# Patient Record
Sex: FEMALE | Race: WHITE | NOT HISPANIC OR LATINO | Employment: FULL TIME | URBAN - METROPOLITAN AREA
[De-identification: names, ages, dates, MRNs, and addresses within clinical notes are randomized per-mention and may not be internally consistent; named-entity substitution may affect disease eponyms.]

---

## 2018-09-13 ENCOUNTER — TRANSFERRED RECORDS (OUTPATIENT)
Dept: HEALTH INFORMATION MANAGEMENT | Facility: CLINIC | Age: 33
End: 2018-09-13

## 2018-09-13 LAB
HPV ABSTRACT: NORMAL
PAP-ABSTRACT: NORMAL

## 2019-09-27 ENCOUNTER — TRANSFERRED RECORDS (OUTPATIENT)
Dept: HEALTH INFORMATION MANAGEMENT | Facility: CLINIC | Age: 34
End: 2019-09-27

## 2019-10-02 ENCOUNTER — THERAPY VISIT (OUTPATIENT)
Dept: PHYSICAL THERAPY | Facility: CLINIC | Age: 34
End: 2019-10-02
Payer: COMMERCIAL

## 2019-10-02 DIAGNOSIS — M72.2 BILATERAL PLANTAR FASCIITIS: ICD-10-CM

## 2019-10-02 DIAGNOSIS — M76.829 POSTERIOR TIBIAL TENDON DYSFUNCTION: ICD-10-CM

## 2019-10-02 PROCEDURE — 97161 PT EVAL LOW COMPLEX 20 MIN: CPT | Mod: GP | Performed by: PHYSICAL THERAPIST

## 2019-10-02 PROCEDURE — 97110 THERAPEUTIC EXERCISES: CPT | Mod: GP | Performed by: PHYSICAL THERAPIST

## 2019-10-02 NOTE — LETTER
[facility]  [address]  [place] [address]  [phone]    October 3, 2019    Re: Afia Pierce   :   1985  MRN:  [id number]   REFERRING PHYSICIAN:   Nirmal Otoole    [facility] PHYSICAL [facility]    Date of Initial Evaluation:  10/2/2019  Visits:  Rxs Used: 1  Reason for Referral:     Posterior tibial tendon dysfunction  Bilateral plantar fasciitis    EVALUATION SUMMARY    [facility]  Subjective:  The history is provided by the patient.  No  was used.   Type of problem: Bilateral ankles (left greater than right)  Condition occurred with: Insidious onset.  This is a chronic condition.  Problem details: 2018, I bought some shoes and as time progressed I started to have feet problems.  I saw the MD 2019.  I have orders for orthotics, going to get those made.  I had one ankle injury growing up.  I stand all the time for my job.  Patient reports pain: Anterior, longitudinal arch and medial calcaneal tuberosity (right anterior).  Radiates to: Ankle and lower leg (throbbing leg pain at night).  Associated with: none.  Symptoms are exacerbated by walking and weight bearing and relieved by heat.    Afia Pierce being seen for bilateral feet problem.   Problem began 2019.  Where condition occurred: for unknown reasons.  Problem occurred: bad shoes.  Reported as 7/10 (right greater than left) on pain scale.  General health as reported by patient is good.  Pertinent medical history includes: None.  Other medical allergies details: CT dye, cats, sulfa.  Surgeries include: None.  Current medications: None.  Other medications details: vitamins.  Primary job tasks include:  Prolonged standing, operating a machine/assembly and lifting/carrying.  Pain is described as aching and is constant.  Pain is worse in the P.M.  Since onset  symptoms are unchanged.  Imaging testing: none.  Patient is [facility]. Restrictions include: Working in normal job without restrictions.    Home/work barriers: apartment--3rd floor, no elevator   Red flags: None as reported by patient.    Objective:  Standing Alignment:    Lumbar: Sway back  Ankle/Foot: Calcaneal valgus L and calcaneal valgus R  General Deviations: Toe out L and toe out R    Re: Afia Pierce   :   1985    Gait:    Deviations:    Ankle: Push off decr L, push off decr R, heel strike decr L and heel strike decr R  General Deviations: Toe out L, toe out R, jennifer decr and stride length decr    Flexibility/Screens:   Lower Extremity:  Decreased left lower extremity flexibility: Gastroc and Soleus  Decreased right lower extremity flexibility: Gastroc and Soleus    Ankle/Foot Evaluation  ROM:    AROM:    Dorsiflexion:   Left: 8   Right: 6  Plantarflexion: Left: 55     Right: 58  Inversion:   Left: 30      Right: 28  Eversion:   Left: 15      Right: 15    Strength:    Dorsiflexion:    Left: 5/5      Pain:     Right: 5/5    Pain:  Plantarflexion:  Left: 5/5    Pain:     Right: 5/5  Pain:  Flexion Great Toe: Left: 5/5   Pain:    Right: 5/5    Pain:  Extension Great Toe: Left: 5/5   Pain:    Right: 5/5   Pain:  Anterior Tibialis: Left: 5/5   Pain:    Right: 4+/5   Pain:  Posterior Tibialis:  Left: 5/5   Pain:    Right: 4+/5   Pain:  Peroneals:   Left: 4+/5   Pain:    Right: 5-/5   Pain:  Extensor Digitorum:  Left: 4+/5   Pain:  Right: 5/5   Pain:    PALPATION:   Left ankle tenderness present at: posterior tibialis and plantar fascia  Right ankle tenderness present at: posterior tibialis and plantar fascia    MOBILITY TESTING:   Talocrural Left: hypomobile Talocrural Right: hypomobile  Subtalar Left: hypomobile     Subtalar Right: hypomobile    FUNCTIONAL TESTS:   Proprioception:  Stork Balance Test:   Left: poor hip control     Right: poor hip control      Assessment/Plan:    Patient is  a 34 year old female with both sides ankle and bilateral plantar fasciitis complaints.    Patient has the following significant findings with corresponding treatment plan.                Diagnosis 1: Bilateral plantar fasciitis and PTTD and left lateral instability    Pain - manual therapy, self management, education and home program  Decreased ROM/flexibility - manual therapy, therapeutic exercise, therapeutic activity and home program    Re: Afia Pierce   :   1985    Decreased joint mobility - manual therapy, therapeutic exercise, therapeutic activity and home program  Decreased strength - therapeutic exercise, therapeutic activities and home program  Impaired muscle performance - neuro re-education and home program  Decreased function - therapeutic activities and home program  Impaired posture - neuro re-education, therapeutic activities and home program     Therapy Evaluation Codes:   Cumulative Therapy Evaluation is: Low complexity.    Previous and current functional limitations: (See Goal Flow Sheet for this information)    Short term and Long term goals: (See Goal Flow Sheet for this information)     Communication ability: Patient appears to be able to clearly communicate and understand verbal and written communication and follow directions correctly.  Treatment Explanation - The following has been discussed with the patient:     RX ordered/plan of care  This patient would benefit from PT intervention to resume normal activities.   Rehab potential is good.    Frequency: 1 X week, once daily  Duration: for 8 weeks  Discharge Plan: Achieve all LTG.  Independent in home treatment program.      Thank you for your referral.      INQUIRIES  Therapist: Caitlin Lopez, PT   [facility] [place] [facility] PH[facility] THERAP[facility]  [address] [address]  Phone: [phone]  Fax: [phone]

## 2019-10-02 NOTE — PROGRESS NOTES
[facility] Initial Evaluation  Subjective:  The history is provided by the patient. No  was used.   Type of problem:  Bilateral ankles (left greater than right)   Condition occurred with:  Insidious onset. This is a chronic condition   Problem details: 12/2018, I bought some shoes and as time progressed I started to have feet problems.  I saw the MD 7/26/2019.  I have orders for orthotic going to get those made.  I had one ankle injury growing up.  I stand all the time for my job.   Patient reports pain:  Anterior, longitudinal arch and medial calcaneal tuberosity (right anterior). Radiates to:  Ankle and lower leg (throbbing leg pain at night). Associated with: none. Symptoms are exacerbated by walking and weight bearing and relieved by heat.    Afia Pierce being seen for bilateral feet problem.   Problem began 7/26/2019. Where condition occurred: for unknown reasons.Problem occurred: bad shoes  and reported as 7/10 (right greater than left ) on pain scale. General health as reported by patient is good. Pertinent medical history includes:  None.   Other medical allergies details: CT dye, cats sulfa.  Surgeries include:  None.  Current medications:  None. Other medications details: vitamins.   Primary job tasks include:  Prolonged standing, operating a machine/assembly and lifting/carrying.  Pain is described as aching and is constant. Pain is worse in the P.M.. Since onset symptoms are unchanged. Imaging testing: none.     Patient is [employer] . Restrictions include:  Working in normal job without restrictions.    Home/work barriers: apartment--3rd floor no elevator   Red flags:  None as reported by patient.                      Objective:  Standing Alignment:        Lumbar:  Sway back        Ankle/Foot:  Calcaneal valgus L and calcaneal valgus R  General Deviations:  Toe out L and toe out R  Gait:      Deviations:  Ankle:  Push off decr L, push off decr R,  heel strike decr L and heel strike decr RGeneral Deviations:  Toe out L, toe out R, jennifer decr and stride length decr    Flexibility/Screens:       Lower Extremity:  Decreased left lower extremity flexibility:Gastroc and Soleus    Decreased right lower extremity flexibility:  Gastroc and Soleus          Ankle/Foot Evaluation  ROM:    AROM:    Dorsiflexion:  Left:   8  Right:   6  Plantarflexion:  Left:  55    Right:  58  Inversion:  Left:  30     Right:  28  Eversion:  15     Right:  15        Strength:    Dorsiflexion:  Left: 5/5     Pain:   Right: 5/5   Pain:  Plantarflexion: Left: 5/5   Pain:   Right: 5/5  Pain:      Flexion Great Toe:Left: 5/5  Pain:  Right: 5/5   Pain:  Extension Great Toe:Left: 5/5  Pain:  Right: 5/5  Pain:  Anterior Tibialis:Left: 5/5  Pain:  Right: 4+/5  Pain:  Posterior Tibialis: Left: 5/5  Pain:  Right: 4+/5  Pain:  Peroneals: Left: 4+/5  Pain:  Right: 5-/5  Pain:  Extensor Digitorum: Left: 4+/5  Pain:Right: 5/5  Pain:          PALPATION:   Left ankle tenderness present at:  posterior tibialis and plantar fascia  Right ankle tenderness present at:   posterior tibialis and plantar fascia    MOBILITY TESTING:       Talocrural Left: hypomobile    Talocrural Right: hypomobile  Subtalar Left: hypomobile    Subtalar Right: hypomobile      FUNCTIONAL TESTS:           Proprioception:  Stork Balance Test: Left: poor hip control   Right: poor hip control                                                      General     ROS    Assessment/Plan:    Patient is a 34 year old female with both sides ankle and bilateral plantar fasciitis complaints.    Patient has the following significant findings with corresponding treatment plan.                Diagnosis 1:  Bilateral plantar fasciitis and PTTD and left lateral instability  Pain -  manual therapy, self management, education and home program  Decreased ROM/flexibility - manual therapy, therapeutic exercise, therapeutic activity and home  program  Decreased joint mobility - manual therapy, therapeutic exercise, therapeutic activity and home program  Decreased strength - therapeutic exercise, therapeutic activities and home program  Impaired muscle performance - neuro re-education and home program  Decreased function - therapeutic activities and home program  Impaired posture - neuro re-education, therapeutic activities and home program     Therapy Evaluation Codes:   Cumulative Therapy Evaluation is: Low complexity.    Previous and current functional limitations:  (See Goal Flow Sheet for this information)    Short term and Long term goals: (See Goal Flow Sheet for this information)     Communication ability:  Patient appears to be able to clearly communicate and understand verbal and written communication and follow directions correctly.  Treatment Explanation - The following has been discussed with the patient:   RX ordered/plan of care  This patient would benefit from PT intervention to resume normal activities.   Rehab potential is good.    Frequency:  1 X week, once daily  Duration:  for 8 weeks  Discharge Plan:  Achieve all LTG.  Independent in home treatment program.    Please refer to the daily flowsheet for treatment today, total treatment time and time spent performing 1:1 timed codes.

## 2019-10-03 PROBLEM — M72.2 BILATERAL PLANTAR FASCIITIS: Status: ACTIVE | Noted: 2019-10-03

## 2019-10-03 PROBLEM — M76.829 POSTERIOR TIBIAL TENDON DYSFUNCTION: Status: ACTIVE | Noted: 2019-10-03

## 2019-10-09 ENCOUNTER — THERAPY VISIT (OUTPATIENT)
Dept: PHYSICAL THERAPY | Facility: CLINIC | Age: 34
End: 2019-10-09
Payer: COMMERCIAL

## 2019-10-09 DIAGNOSIS — M76.829 POSTERIOR TIBIAL TENDON DYSFUNCTION: ICD-10-CM

## 2019-10-09 DIAGNOSIS — M72.2 BILATERAL PLANTAR FASCIITIS: ICD-10-CM

## 2019-10-09 PROCEDURE — 97140 MANUAL THERAPY 1/> REGIONS: CPT | Mod: GP | Performed by: PHYSICAL THERAPIST

## 2019-10-09 PROCEDURE — 97112 NEUROMUSCULAR REEDUCATION: CPT | Mod: GP | Performed by: PHYSICAL THERAPIST

## 2019-10-09 PROCEDURE — 97110 THERAPEUTIC EXERCISES: CPT | Mod: GP | Performed by: PHYSICAL THERAPIST

## 2019-10-17 ENCOUNTER — THERAPY VISIT (OUTPATIENT)
Dept: PHYSICAL THERAPY | Facility: CLINIC | Age: 34
End: 2019-10-17
Payer: COMMERCIAL

## 2019-10-17 DIAGNOSIS — M76.829 POSTERIOR TIBIAL TENDON DYSFUNCTION: ICD-10-CM

## 2019-10-17 DIAGNOSIS — M72.2 BILATERAL PLANTAR FASCIITIS: ICD-10-CM

## 2019-10-17 PROCEDURE — 97112 NEUROMUSCULAR REEDUCATION: CPT | Mod: GP | Performed by: PHYSICAL THERAPIST

## 2019-10-17 PROCEDURE — 97110 THERAPEUTIC EXERCISES: CPT | Mod: GP | Performed by: PHYSICAL THERAPIST

## 2019-10-17 PROCEDURE — 97530 THERAPEUTIC ACTIVITIES: CPT | Mod: GP | Performed by: PHYSICAL THERAPIST

## 2019-10-31 NOTE — PROGRESS NOTES
SUBJECTIVE:                                                   Afia Pierce is a 34 year old female who presents to clinic today for the following health issue(s):  Patient presents with:  Consult: pevlic pain      HPI: Patient is seen at this time for evaluation of progressive increase in pelvic pain.  She has midcycle and severe premenstrual pain.  She does have dyspareunia.  She has never been pregnant but did have a hysterosalpingogram that was negative.  She has had no previous pelvic surgery.  She does not recall any recent ultrasonography of the pelvis.  She is otherwise healthy.      No LMP recorded..     Patient is sexually active, No obstetric history on file..  Using none for contraception.    reports that she has never smoked. She has never used smokeless tobacco.    STD testing offered?  Declined    Health maintenance updated:  yes    Today's PHQ-2 Score: No flowsheet data found.  Today's PHQ-9 Score:   PHQ-9 SCORE 11/4/2019   PHQ-9 Total Score 3     Today's ANSLEY-7 Score:   ANSLEY-7 SCORE 11/4/2019   Total Score 2       Problem list and histories reviewed & adjusted, as indicated.  Additional history: as documented.    Patient Active Problem List   Diagnosis     Posterior tibial tendon dysfunction     Bilateral plantar fasciitis     Past Surgical History:   Procedure Laterality Date     NO HISTORY OF SURGERY        Social History     Tobacco Use     Smoking status: Never Smoker     Smokeless tobacco: Never Used   Substance Use Topics     Alcohol use: Not Currently      Problem (# of Occurrences) Relation (Name,Age of Onset)    Breast Cancer (1) Maternal Aunt    Diabetes (1) Maternal Grandmother    Ovarian Cancer (1) Maternal Aunt    Uterine Cancer (1) Maternal Aunt            Current Outpatient Medications   Medication Sig     MULTIPLE VITAMIN PO      Omega-3 Fatty Acids (FISH OIL PO)      ondansetron (ZOFRAN) 4 MG tablet Take by mouth every 8 hours as needed for nausea     No current facility-administered  "medications for this visit.      Allergies   Allergen Reactions     Sulfa Drugs Other (See Comments)     GI Upset     Diagnostic X-Ray Materials Itching     Mild itching and hives  Mild itching and hives         ROS:  12 point review of systems negative other than symptoms noted below.  Constitutional: Loss of Appetite and Weight Loss  Head: Nasal Congestion  Gastrointestinal: Abdominal Pain, Bloating, Constipation, Heartburn and Nausea  Genitourinary: Cramps, Painful [place], Painful Urination, Pelvic Pain and Vaginal Dryness  Skin: Acne and New Skin Lesions  Neurologic: Dizziness and Headaches  Musculoskeletal: Muscular Weakness  Endocrine: Cold Intolerance, Decreased Libido and Loss of Hair  Psychiatric: Difficulty Sleeping and Alas    OBJECTIVE:     BP 96/58   Pulse 64   Ht 1.549 m (5' 1\")   Wt 40.4 kg (89 lb)   BMI 16.82 kg/m    Body mass index is 16.82 kg/m .    Exam:  Constitutional:  Appearance: Well nourished, well developed alert, in no acute distress HEENT is negative.  Chest is clear to percussion auscultation all fields.  Cardiovascular exam within normal limits with normal S1-S2 no murmur.  Gastrointestinal:  Abdominal Examination:  Abdomen nontender to palpation, tone normal without rigidity or guarding, no masses present, umbilicus without lesions; Liver/Spleen:  No hepatomegaly present, liver nontender to palpation; Hernias:  No hernias present  Lymphatic: Lymph Nodes:  No other lymphadenopathy present  Skin: General Inspection:  No rashes present, no lesions present, no areas of discoloration.  Neurologic:  Mental Status:  Oriented X3.  Normal strength and tone, sensory exam grossly normal, mentation intact and speech normal.    Psychiatric:  Mentation appears normal and affect normal/bright.  Pelvic Exam:  External Genitalia:     Normal appearance for age, no discharge present, no tenderness present, no inflammatory lesions present, color normal  Vagina:     Normal vaginal vault without " central or paravaginal defects, no discharge present, no inflammatory lesions present, no masses present  Bladder:     Nontender to palpation  Urethra:   Urethral Body:  Urethra palpation normal, urethra structural support normal   Urethral Meatus:  No erythema or lesions present  Cervix:     Appearance healthy, no lesions present, nontender to palpation, no bleeding present  Uterus:     Uterus: firm, normal sized and tender, midplane in position.   Adnexa:     Right adnexal tenderness present, no adnexal masses present  Perineum:     Perineum within normal limits, no evidence of trauma, no rashes or skin lesions present  Anus:     Anus within normal limits, no hemorrhoids present  Inguinal Lymph Nodes:     No lymphadenopathy present  Pubic Hair:     Normal pubic hair distribution for age  Genitalia and Groin:     No rashes present, no lesions present, no areas of discoloration, no masses present       In-Clinic Test Results:      ASSESSMENT/PLAN:                                                      Patient is seen at this time for evaluation of pelvic pain.  It appears that she has a very tender right adnexa and nodular tender uterosacral ligaments bilaterally.  She has not had any imaging for over a year and we will proceed to a vaginal probe ultrasound.  The possibility of endometriosis is very real.  We discussed the option of laparoscopy with CO2 laser treatment.  The patient will be seen immediately after ultrasound.          Marvin Solomon MD  [facility] [place]

## 2019-11-04 ENCOUNTER — PREP FOR PROCEDURE (OUTPATIENT)
Dept: OBGYN | Facility: CLINIC | Age: 34
End: 2019-11-04

## 2019-11-04 ENCOUNTER — TELEPHONE (OUTPATIENT)
Dept: OBGYN | Facility: CLINIC | Age: 34
End: 2019-11-04

## 2019-11-04 ENCOUNTER — OFFICE VISIT (OUTPATIENT)
Dept: OBGYN | Facility: CLINIC | Age: 34
End: 2019-11-04
Payer: COMMERCIAL

## 2019-11-04 VITALS
SYSTOLIC BLOOD PRESSURE: 96 MMHG | HEIGHT: 61 IN | HEART RATE: 64 BPM | DIASTOLIC BLOOD PRESSURE: 58 MMHG | WEIGHT: 89 LBS | BODY MASS INDEX: 16.8 KG/M2

## 2019-11-04 DIAGNOSIS — R10.2 PELVIC PAIN: Primary | ICD-10-CM

## 2019-11-04 PROCEDURE — 99204 OFFICE O/P NEW MOD 45 MIN: CPT | Performed by: OBSTETRICS & GYNECOLOGY

## 2019-11-04 RX ORDER — ONDANSETRON 4 MG/1
TABLET, FILM COATED ORAL EVERY 8 HOURS PRN
COMMUNITY

## 2019-11-04 ASSESSMENT — PATIENT HEALTH QUESTIONNAIRE - PHQ9
5. POOR APPETITE OR OVEREATING: NOT AT ALL
SUM OF ALL RESPONSES TO PHQ QUESTIONS 1-9: 3

## 2019-11-04 ASSESSMENT — MIFFLIN-ST. JEOR: SCORE: 1051.08

## 2019-11-04 ASSESSMENT — ANXIETY QUESTIONNAIRES
3. WORRYING TOO MUCH ABOUT DIFFERENT THINGS: SEVERAL DAYS
7. FEELING AFRAID AS IF SOMETHING AWFUL MIGHT HAPPEN: NOT AT ALL
GAD7 TOTAL SCORE: 2
1. FEELING NERVOUS, ANXIOUS, OR ON EDGE: NOT AT ALL
IF YOU CHECKED OFF ANY PROBLEMS ON THIS QUESTIONNAIRE, HOW DIFFICULT HAVE THESE PROBLEMS MADE IT FOR YOU TO DO YOUR WORK, TAKE CARE OF THINGS AT HOME, OR GET ALONG WITH OTHER PEOPLE: NOT DIFFICULT AT ALL
6. BECOMING EASILY ANNOYED OR IRRITABLE: SEVERAL DAYS
2. NOT BEING ABLE TO STOP OR CONTROL WORRYING: NOT AT ALL
5. BEING SO RESTLESS THAT IT IS HARD TO SIT STILL: NOT AT ALL

## 2019-11-04 NOTE — TELEPHONE ENCOUNTER
Type of surgery: LSC C02 LASER  Location of surgery: [facility] OR  Date and time of surgery: 11/21/2019 9:30a  Surgeon: Juanito  Pre-Op Appt Date: 11/13/2019  Post-Op Appt Date: TBD   Packet sent out: HANDED 11/4/2019  Pre-cert/Authorization completed:  TBD  Date: 11/4/2019 Jasmeet das/Selene Camarena  Surgery Scheduler

## 2019-11-05 ASSESSMENT — ANXIETY QUESTIONNAIRES: GAD7 TOTAL SCORE: 2

## 2019-11-11 NOTE — PROGRESS NOTES
SUBJECTIVE:                                                   Afia Pierce is a 34 year old female who presents to clinic today for the following health issue(s):  Patient presents with:  Ultrasound      HPI: The patient is seen at this time in follow-up of pelvic pain.  She has pain most mornings and avoid sexual activity because of dyspareunia.  Ultrasound is performed today.      Patient's last menstrual period was 10/16/2019..     Patient is sexually active, .  Using none for contraception.    reports that she has never smoked. She has never used smokeless tobacco.    STD testing offered?  Declined    Health maintenance updated:  yes    Today's PHQ-2 Score:   PHQ-2 (  [employer]) 2019   Q1: Little interest or pleasure in doing things 0   Q2: Feeling down, depressed or hopeless 0   PHQ-2 Score 0     Today's PHQ-9 Score:   PHQ-9 SCORE 2019   PHQ-9 Total Score 3     Today's ANSLEY-7 Score:   ANSLEY-7 SCORE 2019   Total Score 2       Problem list and histories reviewed & adjusted, as indicated.  Additional history: as documented.    Patient Active Problem List   Diagnosis     Posterior tibial tendon dysfunction     Bilateral plantar fasciitis     Pelvic pain     Past Surgical History:   Procedure Laterality Date     NO HISTORY OF SURGERY        Social History     Tobacco Use     Smoking status: Never Smoker     Smokeless tobacco: Never Used   Substance Use Topics     Alcohol use: Not Currently      Problem (# of Occurrences) Relation (Name,Age of Onset)    Breast Cancer (1) Maternal Aunt    Diabetes (1) Maternal Grandmother    Ovarian Cancer (1) Maternal Aunt    Uterine Cancer (1) Maternal Aunt            Current Outpatient Medications   Medication Sig     MULTIPLE VITAMIN PO      Omega-3 Fatty Acids (FISH OIL PO)      ondansetron (ZOFRAN) 4 MG tablet Take by mouth every 8 hours as needed for nausea     No current facility-administered medications for this visit.      Allergies   Allergen  "Reactions     Sulfa Drugs Other (See Comments)     GI Upset     Diagnostic X-Ray Materials Itching     Mild itching and hives  Mild itching and hives         ROS:  12 point review of systems negative other than symptoms noted below.    OBJECTIVE:     BP 94/58   Pulse 64   Ht 1.549 m (5' 1\")   Wt 40.4 kg (89 lb)   LMP 10/16/2019   BMI 16.82 kg/m    Body mass index is 16.82 kg/m .    Exam:  Constitutional:  Appearance: Well nourished, well developed alert, in no acute distress  Neck:  Lymph Nodes:  No lymphadenopathy present; Thyroid:  Gland size normal, nontender, no nodules or masses present on palpation  Chest:  Respiratory Effort:  Breathing unlabored. Clear to auscultation bilaterally.   Cardiovascular: Heart: Auscultation:  Regular rate, normal rhythm, no murmurs present  Gastrointestinal:  Abdominal Examination:  Abdomen nontender to palpation, tone normal without rigidity or guarding, no masses present, umbilicus without lesions; Liver/Spleen:  No hepatomegaly present, liver nontender to palpation; Hernias:  No hernias present  Lymphatic: Lymph Nodes:  No other lymphadenopathy present  Skin: General Inspection:  No rashes present, no lesions present, no areas of discoloration.  Neurologic:  Mental Status:  Oriented X3.  Normal strength and tone, sensory exam grossly normal, mentation intact and speech normal.    Psychiatric:  Mentation appears normal and affect normal/bright.  No Pelvic Exam performed due to ultrasound    In-Clinic Test Results: Ultrasound shows no fibroids or large ovarian masses.  Her endometrial lining is 8 mm.      ASSESSMENT/PLAN:                                                        34-year-old patient with debilitating pelvic pain who is seen in follow-up of ultrasound today.  There were no large ovarian cysts or uterine fibroids.  We have presented the option of laparoscopic evaluation to rule out endometriosis as her last pelvic examination showed tenderness on the uterosacral " ligaments and on the right adnexa.  The risks and complications of the procedure have been reviewed and accepted.  This includes general anesthesia blood loss infection injury to bowel bladder ureters and major vessels necessitating further surgery.        Marvin Solomon MD  [facility]

## 2019-11-13 ENCOUNTER — OFFICE VISIT (OUTPATIENT)
Dept: OBGYN | Facility: CLINIC | Age: 34
End: 2019-11-13
Attending: OBSTETRICS & GYNECOLOGY
Payer: COMMERCIAL

## 2019-11-13 ENCOUNTER — ANCILLARY PROCEDURE (OUTPATIENT)
Dept: ULTRASOUND IMAGING | Facility: CLINIC | Age: 34
End: 2019-11-13
Attending: OBSTETRICS & GYNECOLOGY
Payer: COMMERCIAL

## 2019-11-13 VITALS
BODY MASS INDEX: 16.8 KG/M2 | HEIGHT: 61 IN | DIASTOLIC BLOOD PRESSURE: 58 MMHG | SYSTOLIC BLOOD PRESSURE: 94 MMHG | HEART RATE: 64 BPM | WEIGHT: 89 LBS

## 2019-11-13 DIAGNOSIS — R10.2 PELVIC PAIN: ICD-10-CM

## 2019-11-13 DIAGNOSIS — R10.2 PELVIC PAIN: Primary | ICD-10-CM

## 2019-11-13 PROCEDURE — 76830 TRANSVAGINAL US NON-OB: CPT | Performed by: OBSTETRICS & GYNECOLOGY

## 2019-11-13 PROCEDURE — 99214 OFFICE O/P EST MOD 30 MIN: CPT | Performed by: OBSTETRICS & GYNECOLOGY

## 2019-11-13 ASSESSMENT — MIFFLIN-ST. JEOR: SCORE: 1051.08

## 2019-11-13 NOTE — Clinical Note
Please abstract the following data from this visit with this patient into the appropriate field in Epic:Tests that can be patient reported without a hard copy:Other Tests found in the patient's chart through Chart Review/Care Everywhere:Pap smear done by this group [facility] this date: 9/13/18Note to Abstraction: If this section is blank, no results were found via Chart Review/Care Everywhere.

## 2019-11-13 NOTE — NURSING NOTE
Please abstract the following data from this visit with this patient into the appropriate field in Epic:    Tests that can be patient reported without a hard copy:        Other Tests found in the patient's chart through Chart Review/Care Everywhere:    Pap smear done by this group [facility] this date: 9/13/18    Note to Abstraction: If this section is blank, no results were found via Chart Review/Care Everywhere.

## 2019-11-15 ENCOUNTER — HOSPITAL ENCOUNTER (OUTPATIENT)
Dept: NUCLEAR MEDICINE | Facility: CLINIC | Age: 34
Setting detail: NUCLEAR MEDICINE
Discharge: HOME | End: 2019-11-15
Attending: PHYSICIAN ASSISTANT | Admitting: PHYSICIAN ASSISTANT
Payer: COMMERCIAL

## 2019-11-15 VITALS — BODY MASS INDEX: 16.82 KG/M2 | WEIGHT: 89 LBS

## 2019-11-15 DIAGNOSIS — R11.0 NAUSEA: ICD-10-CM

## 2019-11-15 DIAGNOSIS — R10.10 UPPER ABDOMINAL PAIN: ICD-10-CM

## 2019-11-15 DIAGNOSIS — R63.0 LOSS OF APPETITE: ICD-10-CM

## 2019-11-15 DIAGNOSIS — D36.9 ADENOMATOUS POLYP: ICD-10-CM

## 2019-11-15 PROCEDURE — A9537 TC99M MEBROFENIN: HCPCS

## 2019-11-15 PROCEDURE — 78227 HEPATOBIL SYST IMAGE W/DRUG: CPT

## 2019-11-15 PROCEDURE — 25000128 H RX IP 250 OP 636

## 2019-11-15 PROCEDURE — 34300033 ZZH RX 343

## 2019-11-15 RX ORDER — SINCALIDE 5 UG/5ML
0.02 INJECTION, POWDER, LYOPHILIZED, FOR SOLUTION INTRAVENOUS ONCE
Status: COMPLETED | OUTPATIENT
Start: 2019-11-15 | End: 2019-11-15

## 2019-11-15 RX ORDER — KIT FOR THE PREPARATION OF TECHNETIUM TC 99M MEBROFENIN 45 MG/10ML
6 INJECTION, POWDER, LYOPHILIZED, FOR SOLUTION INTRAVENOUS ONCE
Status: COMPLETED | OUTPATIENT
Start: 2019-11-15 | End: 2019-11-15

## 2019-11-15 RX ADMIN — SINCALIDE 0.8 MCG: 5 INJECTION, POWDER, LYOPHILIZED, FOR SOLUTION INTRAVENOUS at 08:50

## 2019-11-15 RX ADMIN — MEBROFENIN 6.2 MILLICURIE: 45 INJECTION, POWDER, LYOPHILIZED, FOR SOLUTION INTRAVENOUS at 07:45

## 2019-11-19 ENCOUNTER — NURSE TRIAGE (OUTPATIENT)
Dept: NURSING | Facility: CLINIC | Age: 34
End: 2019-11-19

## 2019-11-19 NOTE — TELEPHONE ENCOUNTER
Afia had a nuclear medicine test done on Fri, 11/15.    She was told that results should be available after 2 business days.    She is calling about results.    Notified that imaging results have not yet been released to medical record.    She wants to know if she could be called with results.  She reports that the test was ordered by an outside provider.    Advised to contact that provider's office during clinic hours.    She might also call nurse line again to ask if results have been released.    Kerry Mcdonald RN  [facility]    Reason for Disposition    Caller requesting lab results    Additional Information    Lab result questions    Protocols used: PCP CALL - NO TRIAGE-A-AH, INFORMATION ONLY CALL-A-AH

## 2019-11-20 ENCOUNTER — TELEPHONE (OUTPATIENT)
Dept: OBGYN | Facility: CLINIC | Age: 34
End: 2019-11-20

## 2019-11-20 RX ORDER — PHENAZOPYRIDINE HYDROCHLORIDE 200 MG/1
200 TABLET, FILM COATED ORAL ONCE
Status: CANCELLED | OUTPATIENT
Start: 2019-11-20 | End: 2019-11-20

## 2019-11-20 NOTE — TELEPHONE ENCOUNTER
Patient scheduled for surgery 11/21 with Dr. Solomon, she would like a call back regarding cold symptoms she is having.  She is wondering if she needs to cancel her surgery.

## 2019-11-20 NOTE — TELEPHONE ENCOUNTER
Called the pt back. She was calling to report that she has no green nasal drainage but continue to have a sore throat. Feels it is from the post nasal drip. Denies fever/ feels achy. Feels nauseated but that comes and goes.     Pt would like to know if she can still have her surgery tomorrow.    Routing to Dr Solomon to advise.

## 2019-11-20 NOTE — H&P
2019  [facility] [place]   Marvin Solomon MD   OB/Gyn   Pelvic pain   Dx   Ultrasound ; Referred by Marvin Solomon MD   Reason for Visit    Nursing Note   Zoya Pederson MA (Medical Assistant)      Please abstract the following data from this visit with this patient into the appropriate field in Epic:     Tests that can be patient reported without a hard copy:           Other Tests found in the patient's chart through Chart Review/Care Everywhere:     Pap smear done by this group [facility] this date: 18     Note to Abstraction: If this section is blank, no results were found via Chart Review/Care Everywhere.               Progress Notes   Marvin Solomon MD (Physician)     OB/Gyn            SUBJECTIVE:                                                   Afia Pierce is a 34 year old female who presents to clinic today for the following health issue(s):  Patient presents with:  Ultrasound        HPI: The patient is seen at this time in follow-up of pelvic pain.  She has pain most mornings and avoid sexual activity because of dyspareunia.  Ultrasound is performed today.        Patient's last menstrual period was 10/16/2019..      Patient is sexually active, .  Using none for contraception.    reports that she has never smoked. She has never used smokeless tobacco.     STD testing offered?  Declined     Health maintenance updated:  yes     Today's PHQ-2 Score:   PHQ-2 (  [employer]) 2019   Q1: Little interest or pleasure in doing things 0   Q2: Feeling down, depressed or hopeless 0   PHQ-2 Score 0      Today's PHQ-9 Score:   PHQ-9 SCORE 2019   PHQ-9 Total Score 3      Today's ANSLEY-7 Score:   ANSLEY-7 SCORE 2019   Total Score 2         Problem list and histories reviewed & adjusted, as indicated.  Additional history: as documented.         Patient Active Problem List   Diagnosis     Posterior tibial tendon dysfunction     Bilateral plantar fasciitis     Pelvic  "pain             Past Surgical History:   Procedure Laterality Date     NO HISTORY OF SURGERY           Social History      Tobacco Use     Smoking status: Never Smoker     Smokeless tobacco: Never Used   Substance Use Topics     Alcohol use: Not Currently       Problem (# of Occurrences) Relation (Name,Age of Onset)     Breast Cancer (1) Maternal Aunt     Diabetes (1) Maternal Grandmother     Ovarian Cancer (1) Maternal Aunt     Uterine Cancer (1) Maternal Aunt                   Current Outpatient Medications   Medication Sig     MULTIPLE VITAMIN PO       Omega-3 Fatty Acids (FISH OIL PO)       ondansetron (ZOFRAN) 4 MG tablet Take by mouth every 8 hours as needed for nausea      No current facility-administered medications for this visit.             Allergies   Allergen Reactions     Sulfa Drugs Other (See Comments)       GI Upset     Diagnostic X-Ray Materials Itching       Mild itching and hives  Mild itching and hives            ROS:  12 point review of systems negative other than symptoms noted below.     OBJECTIVE:      BP 94/58   Pulse 64   Ht 1.549 m (5' 1\")   Wt 40.4 kg (89 lb)   LMP 10/16/2019   BMI 16.82 kg/m    Body mass index is 16.82 kg/m .     Exam:  Constitutional:  Appearance: Well nourished, well developed alert, in no acute distress  Neck:  Lymph Nodes:  No lymphadenopathy present; Thyroid:  Gland size normal, nontender, no nodules or masses present on palpation  Chest:  Respiratory Effort:  Breathing unlabored. Clear to auscultation bilaterally.   Cardiovascular: Heart: Auscultation:  Regular rate, normal rhythm, no murmurs present  Gastrointestinal:  Abdominal Examination:  Abdomen nontender to palpation, tone normal without rigidity or guarding, no masses present, umbilicus without lesions; Liver/Spleen:  No hepatomegaly present, liver nontender to palpation; Hernias:  No hernias present  Lymphatic: Lymph Nodes:  No other lymphadenopathy present  Skin: General Inspection:  No rashes " "present, no lesions present, no areas of discoloration.  Neurologic:  Mental Status:  Oriented X3.  Normal strength and tone, sensory exam grossly normal, mentation intact and speech normal.    Psychiatric:  Mentation appears normal and affect normal/bright.  No Pelvic Exam performed due to ultrasound     In-Clinic Test Results: Ultrasound shows no fibroids or large ovarian masses.  Her endometrial lining is 8 mm.        ASSESSMENT/PLAN:                                                          34-year-old patient with debilitating pelvic pain who is seen in follow-up of ultrasound today.  There were no large ovarian cysts or uterine fibroids.  We have presented the option of laparoscopic evaluation to rule out endometriosis as her last pelvic examination showed tenderness on the uterosacral ligaments and on the right adnexa.  The risks and complications of the procedure have been reviewed and accepted.  This includes general anesthesia blood loss infection injury to bowel bladder ureters and major vessels necessitating further surgery.           Marvin Solomon MD  [facility] [place]      Instructions      After Visit Summary (Automatic SnapShot taken 11/13/2019)   Additional Documentation     Vitals:    BP 94/58    Pulse 64    Ht 1.549 m (5' 1\")    Wt 40.4 kg (89 lb)    LMP 10/16/2019    BMI 16.82 kg/m     BSA 1.32 m        More Vitals    Flowsheets:    NICU VS,    Anthropometrics,    Ambulatory Assessments,    Vitals Reassessment       Encounter Info:    Billing Info,    History,    Allergies,    Detailed Report       Communications         Chart Routed to Abstract [employer]   Sent by Zoya Pederson MA   AVS Reports     Date/Time Report Action User   11/13/2019  8:53 AM After Visit Summary Automatically Generated Marvin Solomon MD   Encounter Information      Provider Department Encounter # Center   11/13/2019 9:15 AM Marvin Solomon MD [facility] [id number] [place]   Reviewed " this Encounter      Medications Problems Allergies History   Marvin Solomon MD   Reviewed Family, Medical, Surgical, Tobacco   Zoya Pederson MA   Reviewed ADL, Alcohol, Drug Use, Family, Medical, Sexual Activity, Surgical, Tobacco   Orders Placed      None   Medication Changes        None      Medication List    Visit Diagnoses         Pelvic pain      Problem List

## 2019-11-21 ENCOUNTER — SURGERY (OUTPATIENT)
Age: 34
End: 2019-11-21
Payer: COMMERCIAL

## 2019-11-21 ENCOUNTER — ANESTHESIA (OUTPATIENT)
Dept: SURGERY | Facility: CLINIC | Age: 34
End: 2019-11-21
Payer: COMMERCIAL

## 2019-11-21 ENCOUNTER — ANESTHESIA EVENT (OUTPATIENT)
Dept: SURGERY | Facility: CLINIC | Age: 34
End: 2019-11-21
Payer: COMMERCIAL

## 2019-11-21 ENCOUNTER — HOSPITAL ENCOUNTER (OUTPATIENT)
Facility: CLINIC | Age: 34
Discharge: HOME | End: 2019-11-21
Attending: OBSTETRICS & GYNECOLOGY | Admitting: OBSTETRICS & GYNECOLOGY
Payer: COMMERCIAL

## 2019-11-21 VITALS
WEIGHT: 89 LBS | HEIGHT: 61 IN | RESPIRATION RATE: 16 BRPM | OXYGEN SATURATION: 99 % | DIASTOLIC BLOOD PRESSURE: 66 MMHG | TEMPERATURE: 98.6 F | SYSTOLIC BLOOD PRESSURE: 109 MMHG | BODY MASS INDEX: 16.8 KG/M2 | HEART RATE: 81 BPM

## 2019-11-21 DIAGNOSIS — R10.2 PELVIC PAIN: ICD-10-CM

## 2019-11-21 LAB — B-HCG SERPL-ACNC: <1 IU/L (ref 0–5)

## 2019-11-21 PROCEDURE — 36000093 ZZH SURGERY LEVEL 4 1ST 30 MIN: Performed by: OBSTETRICS & GYNECOLOGY

## 2019-11-21 PROCEDURE — 25000125 ZZHC RX 250: Performed by: NURSE ANESTHETIST, CERTIFIED REGISTERED

## 2019-11-21 PROCEDURE — 25000566 ZZH SEVOFLURANE, EA 15 MIN: Performed by: OBSTETRICS & GYNECOLOGY

## 2019-11-21 PROCEDURE — 71000027 ZZH RECOVERY PHASE 2 EACH 15 MINS: Performed by: OBSTETRICS & GYNECOLOGY

## 2019-11-21 PROCEDURE — 36000063 ZZH SURGERY LEVEL 4 EA 15 ADDTL MIN: Performed by: OBSTETRICS & GYNECOLOGY

## 2019-11-21 PROCEDURE — 58662 LAPAROSCOPY EXCISE LESIONS: CPT | Performed by: OBSTETRICS & GYNECOLOGY

## 2019-11-21 PROCEDURE — 71000013 ZZH RECOVERY PHASE 1 LEVEL 1 EA ADDTL HR: Performed by: OBSTETRICS & GYNECOLOGY

## 2019-11-21 PROCEDURE — 25800030 ZZH RX IP 258 OP 636: Performed by: NURSE ANESTHETIST, CERTIFIED REGISTERED

## 2019-11-21 PROCEDURE — 40000170 ZZH STATISTIC PRE-PROCEDURE ASSESSMENT II: Performed by: OBSTETRICS & GYNECOLOGY

## 2019-11-21 PROCEDURE — 37000008 ZZH ANESTHESIA TECHNICAL FEE, 1ST 30 MIN: Performed by: OBSTETRICS & GYNECOLOGY

## 2019-11-21 PROCEDURE — 25000132 ZZH RX MED GY IP 250 OP 250 PS 637: Performed by: ANESTHESIOLOGY

## 2019-11-21 PROCEDURE — 27210794 ZZH OR GENERAL SUPPLY STERILE: Performed by: OBSTETRICS & GYNECOLOGY

## 2019-11-21 PROCEDURE — 25000128 H RX IP 250 OP 636: Performed by: ANESTHESIOLOGY

## 2019-11-21 PROCEDURE — 71000012 ZZH RECOVERY PHASE 1 LEVEL 1 FIRST HR: Performed by: OBSTETRICS & GYNECOLOGY

## 2019-11-21 PROCEDURE — 25800030 ZZH RX IP 258 OP 636: Performed by: OBSTETRICS & GYNECOLOGY

## 2019-11-21 PROCEDURE — 25000128 H RX IP 250 OP 636: Performed by: OBSTETRICS & GYNECOLOGY

## 2019-11-21 PROCEDURE — 37000009 ZZH ANESTHESIA TECHNICAL FEE, EACH ADDTL 15 MIN: Performed by: OBSTETRICS & GYNECOLOGY

## 2019-11-21 PROCEDURE — 25000128 H RX IP 250 OP 636: Performed by: NURSE ANESTHETIST, CERTIFIED REGISTERED

## 2019-11-21 PROCEDURE — 84702 CHORIONIC GONADOTROPIN TEST: CPT | Performed by: OBSTETRICS & GYNECOLOGY

## 2019-11-21 PROCEDURE — 36415 COLL VENOUS BLD VENIPUNCTURE: CPT | Performed by: OBSTETRICS & GYNECOLOGY

## 2019-11-21 RX ORDER — HYDROMORPHONE HYDROCHLORIDE 1 MG/ML
.3-.5 INJECTION, SOLUTION INTRAMUSCULAR; INTRAVENOUS; SUBCUTANEOUS EVERY 10 MIN PRN
Status: DISCONTINUED | OUTPATIENT
Start: 2019-11-21 | End: 2019-11-21 | Stop reason: HOSPADM

## 2019-11-21 RX ORDER — LIDOCAINE HYDROCHLORIDE 20 MG/ML
INJECTION, SOLUTION INFILTRATION; PERINEURAL PRN
Status: DISCONTINUED | OUTPATIENT
Start: 2019-11-21 | End: 2019-11-21

## 2019-11-21 RX ORDER — PROPOFOL 10 MG/ML
INJECTION, EMULSION INTRAVENOUS PRN
Status: DISCONTINUED | OUTPATIENT
Start: 2019-11-21 | End: 2019-11-21

## 2019-11-21 RX ORDER — NALOXONE HYDROCHLORIDE 0.4 MG/ML
.1-.4 INJECTION, SOLUTION INTRAMUSCULAR; INTRAVENOUS; SUBCUTANEOUS
Status: DISCONTINUED | OUTPATIENT
Start: 2019-11-21 | End: 2019-11-21 | Stop reason: HOSPADM

## 2019-11-21 RX ORDER — FENTANYL CITRATE 50 UG/ML
25-50 INJECTION, SOLUTION INTRAMUSCULAR; INTRAVENOUS
Status: DISCONTINUED | OUTPATIENT
Start: 2019-11-21 | End: 2019-11-21 | Stop reason: HOSPADM

## 2019-11-21 RX ORDER — MEPERIDINE HYDROCHLORIDE 25 MG/ML
12.5 INJECTION INTRAMUSCULAR; INTRAVENOUS; SUBCUTANEOUS
Status: DISCONTINUED | OUTPATIENT
Start: 2019-11-21 | End: 2019-11-21 | Stop reason: HOSPADM

## 2019-11-21 RX ORDER — ONDANSETRON 4 MG/1
4 TABLET, ORALLY DISINTEGRATING ORAL EVERY 30 MIN PRN
Status: DISCONTINUED | OUTPATIENT
Start: 2019-11-21 | End: 2019-11-21 | Stop reason: HOSPADM

## 2019-11-21 RX ORDER — ONDANSETRON 2 MG/ML
INJECTION INTRAMUSCULAR; INTRAVENOUS PRN
Status: DISCONTINUED | OUTPATIENT
Start: 2019-11-21 | End: 2019-11-21

## 2019-11-21 RX ORDER — ONDANSETRON 2 MG/ML
4 INJECTION INTRAMUSCULAR; INTRAVENOUS EVERY 30 MIN PRN
Status: DISCONTINUED | OUTPATIENT
Start: 2019-11-21 | End: 2019-11-21 | Stop reason: HOSPADM

## 2019-11-21 RX ORDER — DEXAMETHASONE SODIUM PHOSPHATE 4 MG/ML
INJECTION, SOLUTION INTRA-ARTICULAR; INTRALESIONAL; INTRAMUSCULAR; INTRAVENOUS; SOFT TISSUE PRN
Status: DISCONTINUED | OUTPATIENT
Start: 2019-11-21 | End: 2019-11-21

## 2019-11-21 RX ORDER — BUPIVACAINE HYDROCHLORIDE 2.5 MG/ML
INJECTION, SOLUTION INFILTRATION; PERINEURAL PRN
Status: DISCONTINUED | OUTPATIENT
Start: 2019-11-21 | End: 2019-11-21 | Stop reason: HOSPADM

## 2019-11-21 RX ORDER — SODIUM CHLORIDE, SODIUM LACTATE, POTASSIUM CHLORIDE, CALCIUM CHLORIDE 600; 310; 30; 20 MG/100ML; MG/100ML; MG/100ML; MG/100ML
INJECTION, SOLUTION INTRAVENOUS CONTINUOUS
Status: DISCONTINUED | OUTPATIENT
Start: 2019-11-21 | End: 2019-11-21 | Stop reason: HOSPADM

## 2019-11-21 RX ORDER — HYDROCODONE BITARTRATE AND ACETAMINOPHEN 5; 325 MG/1; MG/1
1 TABLET ORAL
Status: CANCELLED | OUTPATIENT
Start: 2019-11-21

## 2019-11-21 RX ORDER — HYDROCODONE BITARTRATE AND ACETAMINOPHEN 5; 325 MG/1; MG/1
1 TABLET ORAL ONCE
Status: COMPLETED | OUTPATIENT
Start: 2019-11-21 | End: 2019-11-21

## 2019-11-21 RX ORDER — HYDROCODONE BITARTRATE AND ACETAMINOPHEN 5; 325 MG/1; MG/1
1-2 TABLET ORAL EVERY 4 HOURS PRN
Qty: 15 TABLET | Refills: 0 | Status: SHIPPED | OUTPATIENT
Start: 2019-11-21 | End: 2019-12-10

## 2019-11-21 RX ORDER — KETOROLAC TROMETHAMINE 15 MG/ML
15 INJECTION, SOLUTION INTRAMUSCULAR; INTRAVENOUS ONCE
Status: COMPLETED | OUTPATIENT
Start: 2019-11-21 | End: 2019-11-21

## 2019-11-21 RX ORDER — PROPOFOL 10 MG/ML
INJECTION, EMULSION INTRAVENOUS CONTINUOUS PRN
Status: DISCONTINUED | OUTPATIENT
Start: 2019-11-21 | End: 2019-11-21

## 2019-11-21 RX ORDER — FENTANYL CITRATE 50 UG/ML
INJECTION, SOLUTION INTRAMUSCULAR; INTRAVENOUS PRN
Status: DISCONTINUED | OUTPATIENT
Start: 2019-11-21 | End: 2019-11-21

## 2019-11-21 RX ORDER — SODIUM CHLORIDE, SODIUM LACTATE, POTASSIUM CHLORIDE, CALCIUM CHLORIDE 600; 310; 30; 20 MG/100ML; MG/100ML; MG/100ML; MG/100ML
INJECTION, SOLUTION INTRAVENOUS CONTINUOUS PRN
Status: DISCONTINUED | OUTPATIENT
Start: 2019-11-21 | End: 2019-11-21

## 2019-11-21 RX ADMIN — FENTANYL CITRATE 50 MCG: 0.05 INJECTION, SOLUTION INTRAMUSCULAR; INTRAVENOUS at 11:17

## 2019-11-21 RX ADMIN — MIDAZOLAM 2 MG: 1 INJECTION INTRAMUSCULAR; INTRAVENOUS at 10:03

## 2019-11-21 RX ADMIN — SODIUM CHLORIDE, POTASSIUM CHLORIDE, SODIUM LACTATE AND CALCIUM CHLORIDE: 600; 310; 30; 20 INJECTION, SOLUTION INTRAVENOUS at 10:03

## 2019-11-21 RX ADMIN — PROPOFOL 30 MCG/KG/MIN: 10 INJECTION, EMULSION INTRAVENOUS at 10:08

## 2019-11-21 RX ADMIN — SUGAMMADEX 160 MG: 100 INJECTION, SOLUTION INTRAVENOUS at 10:34

## 2019-11-21 RX ADMIN — KETOROLAC TROMETHAMINE 15 MG: 15 INJECTION, SOLUTION INTRAMUSCULAR; INTRAVENOUS at 11:47

## 2019-11-21 RX ADMIN — FENTANYL CITRATE 50 MCG: 50 INJECTION, SOLUTION INTRAMUSCULAR; INTRAVENOUS at 10:08

## 2019-11-21 RX ADMIN — HEPARIN SODIUM 500 ML: 1000 INJECTION, SOLUTION INTRAVENOUS; SUBCUTANEOUS at 10:28

## 2019-11-21 RX ADMIN — DEXAMETHASONE SODIUM PHOSPHATE 4 MG: 4 INJECTION, SOLUTION INTRA-ARTICULAR; INTRALESIONAL; INTRAMUSCULAR; INTRAVENOUS; SOFT TISSUE at 10:13

## 2019-11-21 RX ADMIN — HYDROCODONE BITARTRATE AND ACETAMINOPHEN 1 TABLET: 5; 325 TABLET ORAL at 12:21

## 2019-11-21 RX ADMIN — PROPOFOL 150 MG: 10 INJECTION, EMULSION INTRAVENOUS at 10:08

## 2019-11-21 RX ADMIN — BUPIVACAINE HYDROCHLORIDE 5 ML: 2.5 INJECTION, SOLUTION EPIDURAL; INFILTRATION; INTRACAUDAL; PERINEURAL at 10:38

## 2019-11-21 RX ADMIN — ONDANSETRON 4 MG: 2 INJECTION INTRAMUSCULAR; INTRAVENOUS at 10:30

## 2019-11-21 RX ADMIN — LIDOCAINE HYDROCHLORIDE 40 MG: 20 INJECTION, SOLUTION INFILTRATION; PERINEURAL at 10:08

## 2019-11-21 ASSESSMENT — MIFFLIN-ST. JEOR: SCORE: 1051.08

## 2019-11-21 ASSESSMENT — LIFESTYLE VARIABLES: TOBACCO_USE: 0

## 2019-11-21 ASSESSMENT — ENCOUNTER SYMPTOMS: SEIZURES: 0

## 2019-11-21 NOTE — BRIEF OP NOTE
[facility] Brief Operative Note    Pre-operative diagnosis: Pelvic pain [R10.2]   Post-operative diagnosis ENDOMETRIOSIS, LEFT OVARIAN CYST   Procedure: Procedure(s):  LAPAROSCOPY, DIAGNOSTIC, WITH VAPORIZATION OF ENDOMETROSIS USING CO2 LASER,LEFT OVARIAN CYSTOTOMY   Surgeon(s): Surgeon(s) and Role:     * Marvin Solomon MD - Primary   Estimated blood loss: 2 CC    Specimens: NONE   Findings: ENDO, CYST

## 2019-11-21 NOTE — OP NOTE
Procedure Date: 11/21/2019      REASONS FOR ADMISSION:  Progressive pelvic pain, dyspareunia.      OPERATIVE PROCEDURES:  Diagnostic laparoscopy, laser vaporization of endometriosis, left ovarian cystectomy.      OPERATIVE FINDINGS:  The patient had anterior and posterior cul-de-sac endometriosis and surface disease on both ovaries.  She had a simple cyst in the mid plane of the left ovary that was decompressed with laser cystotomy.  The appendix was visualized and was elongated, but not injected.  Upper abdomen exploration was negative.      DESCRIPTION OF PROCEDURE:  After general anesthesia was induced, the patient was placed in the dorsal lithotomy position and prepped and draped in the usual fashion.  A Palomino catheter was placed for continuous drainage of the bladder.  A uterine manipulator was placed.      Through a subumbilical incision, the Veress needle was placed and 3 liters of CO2 were insufflated.  The laparoscope, trocar and sheath were placed without incident.  A 5 mm left lower quadrant trocar was placed through a small incision in a Marcaine-injected field.  The above findings were noted.  The laser scope was brought in and we vaporized all peritoneal disease in the anterior and posterior cul-de-sac.  The surface disease on both ovaries was vaporized away.  The fallopian tubes looked completely normal.  There was a simple cyst in the mid plane of the left ovary that was decompressed of straw-colored fluid.  This was not an endometrioma.  Upper abdomen exploration was unremarkable.  Copious irrigation was undertaken and all carbon material was irrigated from all laser vaporization sites.  All sites were hemostatic.  At this point, the decision was made to back out.  All gas was exhausted and instruments were removed.  The incisions were closed with 4-0 Vicryl and Steri-Strips.  The estimated blood loss for the case was 2 mL.      POSTOPERATIVE DIAGNOSIS:  Endometriosis.         TEDDY TAVERAS JR,  MD             D: 2019   T: 2019   MT: [id number]      Name:     KAY CHUN   MRN:      [id number]        Account:        [id number]   :      1985           Procedure Date: 2019      Document: [id number]

## 2019-11-21 NOTE — DISCHARGE INSTRUCTIONS
Today you received Toradol, an antiinflammatory medication similar to Ibuprofen.  You should not take other antiinflammatory medication, such as Ibuprofen, Motrin, Advil, Aleve, Naprosyn, etc until 5:45 pm tonight.         Same Day Surgery Discharge Instructions for  Sedation and General Anesthesia       It's not unusual to feel dizzy, light-headed or faint for up to 24 hours after surgery or while taking pain medication.  If you have these symptoms: sit for a few minutes before standing and have someone assist you when you get up to walk or use the bathroom.      You should rest and relax for the next 24 hours. We recommend you make arrangements to have an adult stay with you for at least 24 hours after your discharge.  Avoid hazardous and strenuous activity.      DO NOT DRIVE any vehicle or operate mechanical equipment for 24 hours following the end of your surgery.  Even though you may feel normal, your reactions may be affected by the medication you have received.      Do not drink alcoholic beverages for 24 hours following surgery.       Slowly progress to your regular diet as you feel able. It's not unusual to feel nauseated and/or vomit after receiving anesthesia.  If you develop these symptoms, drink clear liquids (apple juice, ginger ale, broth, 7-up, etc. ) until you feel better.  If your nausea and vomiting persists for 24 hours, please notify your surgeon.        All narcotic pain medications, along with inactivity and anesthesia, can cause constipation. Drinking plenty of liquids and increasing fiber intake will help.      For any questions of a medical nature, call your surgeon.      Do not make important decisions for 24 hours.      If you had general anesthesia, you may have a sore throat for a couple of days related to the breathing tube used during surgery.  You may use Cepacol lozenges to help with this discomfort.  If it worsens or if you develop a fever, contact your surgeon.       If you feel  your pain is not well managed with the pain medications prescribed by your surgeon, please contact your surgeon's office to let them know so they can address your concerns.             HOME CARE FOLLOWING LAPAROSCOPY  [facility]  [phone]      Diet  You have no restrictions on your diet.  During the evening following surgery, drink plenty of fluids and eat a light supper.    Nausea  The anesthesia may produce some nausea.  If you feel nauseated try drinking fluids such as 7-Up, tea, or soup.     Discomfort  The amount of discomfort you can expect is very unpredictable.  If you have pain that cannot be controlled with Tylenol or with the prescription you may have received, you should notify your physician.  The following complaints are not uncommon and should not be cause for concern:  1. Abdominal tenderness; abdominal cramping.  2. Low backache or pain radiating to your shoulders, chest or back.  This is a result of the gas used to inflate your abdomen during surgery.  Lying flat in bed seems to help relieve this.   3. Sore throat for a day or two resulting from the anesthesia tube used during surgery.   4. Some bruising on your abdomen.     Drainage  You may expect a small amount of drainage from the incision on your abdomen and you may change the bandage when necessary.  You will also have a small amount of vaginal drainage for several days; this is normal and no cause for concern.  If excessive bleeding occurs, notify your physician.      If dye was used during your procedure, your urine will initially be bright blue. It will gradually return to yellow throughout the day. Drinking plenty of fluids will help to filter the dye from your urine.    Fever  A low grade fever (not over 101  Fahrenheit) is usual after this procedure.  Do not hesitate to notify your physician if your fever seems excessive.    Activity  Rest on the day of surgery then you may resume your normal activity, as tolerated.  Avoid heavy lifting for one week.    You may shower.  Do not douche or use tampons.  If you also had a D&C, do not resume intercourse until bleeding has ceased.    Emergency Care  Contact your physician if you have any of these problems:   1.  A fever over 101  Fahrenheit   2.  A large amount of bleeding or drainage   3.  Severe pain          **If you have questions or concerns about your procedure,   call Dr. Solomon at [phone]**

## 2019-11-21 NOTE — ANESTHESIA PREPROCEDURE EVALUATION
Anesthesia Pre-Procedure Evaluation    Patient: Afia Pierce   MRN: [id number] : 1985          Preoperative Diagnosis: Pelvic pain [R10.2]    Procedure(s):  LAPAROSCOPY, DIAGNOSTIC, WITH LYSIS OF ADHESIONS USING CO2 LASER ([employer] is bringing both Tech and Laser)    Past Medical History:   Diagnosis Date     GERD (gastroesophageal reflux disease)      NO ACTIVE PROBLEMS      Past Surgical History:   Procedure Laterality Date     COLONOSCOPY       [facility] UGI ENDOSCOPY, SIMPLE EXAM         Anesthesia Evaluation     . Pt has had prior anesthetic.     No history of anesthetic complications          ROS/MED HX    ENT/Pulmonary:      (-) tobacco use and sleep apnea   Neurologic:      (-) seizures and CVA   Cardiovascular:        (-) JAVED   METS/Exercise Tolerance:  >4 METS   Hematologic:         Musculoskeletal:         GI/Hepatic:     (+) GERD      (-) liver disease   Renal/Genitourinary:      (-) renal disease   Endo:      (-) Type II DM and thyroid disease   Psychiatric:         Infectious Disease:         Malignancy:         Other: Comment: Pelvic pain                         Physical Exam  Normal systems: cardiovascular, pulmonary and dental    Airway   Mallampati: I  TM distance: >3 FB  Neck ROM: full    Dental     Cardiovascular       Pulmonary             No results found for: WBC, HGB, HCT, PLT, CRP, SED, NA, POTASSIUM, CHLORIDE, CO2, BUN, CR, GLC, SERENE, PHOS, MAG, ALBUMIN, PROTTOTAL, ALT, AST, GGT, ALKPHOS, BILITOTAL, BILIDIRECT, LIPASE, AMYLASE, MARYANNE, PTT, INR, FIBR, TSH, T4, T3, HCG, HCGS, CKTOTAL, CKMB, TROPN    Preop Vitals  BP Readings from Last 3 Encounters:   19 108/75   19 94/58   19 96/58    Pulse Readings from Last 3 Encounters:   19 64   19 64      Resp Readings from Last 3 Encounters:   19 16    SpO2 Readings from Last 3 Encounters:   19 99%      Temp Readings from Last 1 Encounters:   19 36.7  C (98.1  F) (Oral)    Ht Readings from Last 1  "Encounters:   11/21/19 1.549 m (5' 1\")      Wt Readings from Last 1 Encounters:   11/21/19 40.4 kg (89 lb)    Estimated body mass index is 16.82 kg/m  as calculated from the following:    Height as of this encounter: 1.549 m (5' 1\").    Weight as of this encounter: 40.4 kg (89 lb).       Anesthesia Plan      History & Physical Review  History and physical reviewed and following examination; no interval change.    ASA Status:  2 .    NPO Status:  > 8 hours    Plan for General and ETT with Intravenous induction. Maintenance will be Balanced.    PONV prophylaxis:  Ondansetron (or other 5HT-3) and Dexamethasone or Solumedrol       Postoperative Care  Postoperative pain management:  Multi-modal analgesia.      Consents  Anesthetic plan, risks, benefits and alternatives discussed with:  Patient and Spouse..                 Sreedhar Ocampo MD  "

## 2019-11-21 NOTE — ANESTHESIA CARE TRANSFER NOTE
Patient: Afia Pierce    Procedure(s):  LAPAROSCOPY, DIAGNOSTIC, WITH VAPORIZATION OF ENDOMETROSIS USING CO2 LASER    Diagnosis: Pelvic pain [R10.2]  Diagnosis Additional Information: No value filed.    Anesthesia Type:   General, ETT     Note:  Airway :Face Mask  Patient transferred to:PACU  Comments: Neuromuscular blockade reversed with suggamedex, spontaneous respirations, adequate tidal volumes, followed commands to voice, oropharynx suctioned with soft flexible catheter, extubated atraumatically, extubated with suction, airway patent after extubation.  Oxygen via facemask at 6 liters per minute to PACU. Oxygen tubing connected to wall O2 in PACU, SpO2, NiBP, and EKG monitors and alarms on and functioning, Babatunde Hugger warmer connected to patient gown, report on patient's clinical status given to PACU RN. Handoff Report: Identifed the Patient, Identified the Reponsible Provider, Reviewed the pertinent medical history, Discussed the surgical course, Reviewed Intra-OP anesthesia mangement and issues during anesthesia, Set expectations for post-procedure period and Allowed opportunity for questions and acknowledgement of understanding      Vitals: (Last set prior to Anesthesia Care Transfer)    CRNA VITALS  11/21/2019 1017 - 11/21/2019 1051      11/21/2019             Pulse:  89    SpO2:  100 %    Resp Rate (observed):  14    Resp Rate (set):  10                Electronically Signed By: GUY Osei CRNA  November 21, 2019  10:51 AM

## 2019-11-21 NOTE — ANESTHESIA POSTPROCEDURE EVALUATION
Patient: Afia Pierce    Procedure(s):  LAPAROSCOPY, DIAGNOSTIC, WITH VAPORIZATION OF ENDOMETROSIS USING CO2 LASER  Laparoscopic cystectomy ovarian (benign)    Diagnosis:Pelvic pain [R10.2]  Diagnosis Additional Information: No value filed.    Anesthesia Type:  General, ETT    Note:  Anesthesia Post Evaluation    Patient location during evaluation: PACU  Patient participation: Able to fully participate in evaluation  Level of consciousness: awake and alert  Pain management: satisfactory to patient  Airway patency: patent  Cardiovascular status: hemodynamically stable  Respiratory status: acceptable and unassisted  Hydration status: balanced  PONV: none     Anesthetic complications: None          Last vitals:  Vitals:    11/21/19 1115 11/21/19 1130 11/21/19 1145   BP: 112/80 107/70 102/76   Pulse: 70 58 73   Resp: 17 21 10   Temp: 36.5  C (97.7  F) 36.5  C (97.7  F) 36.6  C (97.9  F)   SpO2: 100% 98% 99%         Electronically Signed By: Sreedhar Ocampo MD  November 21, 2019  11:58 AM

## 2019-11-22 ENCOUNTER — TELEPHONE (OUTPATIENT)
Dept: OBGYN | Facility: CLINIC | Age: 34
End: 2019-11-22

## 2019-11-22 NOTE — TELEPHONE ENCOUNTER
"Called the pt back. She reports that she has a sore throat today and sensitive ears. Has been sitting in the recliner most of the morning. Says is vdg but unable to report if the urine is dark colored or cloudy or if she sees any blood. Says has some discomfort with peeing. Thinks she has passed some gas.  Has some shoulder discomfort. Rates her pain at a 9 or 10. \"It hurts to get up so I do not want to move.\"  Encouraged the pt to rest but do get up and walk in house to keep the blood flowing and to gain some strength back.    Encouraged her to continue to drink fluids- will help her sore throat. Test out cool  Vs warm to see what is more soothing for her. Do not push the solid foods but be sure to try to add back into diet slowly.      Suggested to the pt she could try alternating advil and tylenol for pain control.   600 mg of advil every 6 hrs and Tylenol 100 mg every 8 hours. If she uses any of the hydrocodone- she needs to factor in the tylenol in her daily intake of the medication.     Pt verbalizes understanding. She will call back with any other concerns.    "

## 2019-12-07 ENCOUNTER — OFFICE VISIT (OUTPATIENT)
Dept: URGENT CARE | Facility: URGENT CARE | Age: 34
End: 2019-12-07
Payer: COMMERCIAL

## 2019-12-07 ENCOUNTER — NURSE TRIAGE (OUTPATIENT)
Dept: NURSING | Facility: CLINIC | Age: 34
End: 2019-12-07

## 2019-12-07 VITALS
HEART RATE: 86 BPM | WEIGHT: 92 LBS | SYSTOLIC BLOOD PRESSURE: 113 MMHG | TEMPERATURE: 98.2 F | OXYGEN SATURATION: 100 % | DIASTOLIC BLOOD PRESSURE: 73 MMHG | BODY MASS INDEX: 17.38 KG/M2

## 2019-12-07 DIAGNOSIS — R30.0 DYSURIA: Primary | ICD-10-CM

## 2019-12-07 DIAGNOSIS — B96.89 BACTERIAL VAGINOSIS: ICD-10-CM

## 2019-12-07 DIAGNOSIS — N76.0 BACTERIAL VAGINOSIS: ICD-10-CM

## 2019-12-07 LAB
ALBUMIN UR-MCNC: NEGATIVE MG/DL
APPEARANCE UR: CLEAR
BILIRUB UR QL STRIP: NEGATIVE
COLOR UR AUTO: YELLOW
GLUCOSE UR STRIP-MCNC: NEGATIVE MG/DL
HGB UR QL STRIP: ABNORMAL
KETONES UR STRIP-MCNC: NEGATIVE MG/DL
LEUKOCYTE ESTERASE UR QL STRIP: NEGATIVE
NITRATE UR QL: NEGATIVE
NON-SQ EPI CELLS #/AREA URNS LPF: NORMAL /LPF
PH UR STRIP: 7 PH (ref 5–7)
RBC #/AREA URNS AUTO: NORMAL /HPF
SOURCE: ABNORMAL
SP GR UR STRIP: <=1.005 (ref 1–1.03)
SPECIMEN SOURCE: ABNORMAL
UROBILINOGEN UR STRIP-ACNC: 0.2 EU/DL (ref 0.2–1)
WBC #/AREA URNS AUTO: NORMAL /HPF
WET PREP SPEC: ABNORMAL

## 2019-12-07 PROCEDURE — 87210 SMEAR WET MOUNT SALINE/INK: CPT | Performed by: FAMILY MEDICINE

## 2019-12-07 PROCEDURE — 81001 URINALYSIS AUTO W/SCOPE: CPT | Performed by: FAMILY MEDICINE

## 2019-12-07 PROCEDURE — 99203 OFFICE O/P NEW LOW 30 MIN: CPT | Performed by: FAMILY MEDICINE

## 2019-12-07 RX ORDER — METRONIDAZOLE 500 MG/1
500 TABLET ORAL 2 TIMES DAILY
Qty: 20 TABLET | Refills: 0 | Status: SHIPPED | OUTPATIENT
Start: 2019-12-07 | End: 2020-01-31

## 2019-12-07 RX ORDER — MONTELUKAST SODIUM 4 MG/1
1 TABLET, CHEWABLE ORAL
COMMUNITY
Start: 2019-11-21

## 2019-12-07 NOTE — TELEPHONE ENCOUNTER
Patient states she did mention to provider that she has had urinary pain since Laparoscopy on 11/21/19 and it is now worsening.  FNA paged on call provider, Dr. JING Darby, via Smart Web at 9:19AM to contact patient at [phone] and instructed patient to call back if no response in 20 minutes.

## 2019-12-07 NOTE — PATIENT INSTRUCTIONS
Patient Education     Bartholin s Cyst: Common Treatments    The Bartholin s glands are a pair of very small glands found inside the labia (vaginal lips). There is one gland on either side. The glands produce fluid to help keep the vagina moist. When the opening of a Bartholin s gland becomes blocked, the gland may swell and form a cyst. The cyst may vary in size from small to large (1 to 3 cm in size). It may feel like a firm lump within the labia.  Treatment depends on various factors. These include the size of the cyst, whether it causes symptoms, and whether it s infected. Small or uninfected cysts don t usually need treatment. Large cysts and those that are painful or infected will likely need treatment. Below are some common treatments that may be done:    Incision and drainage. In this procedure, the area over the cyst is numbed, cut, and drained. Often, a thin tube (catheter) with a balloon on the end is then inserted into the empty cyst. This allows for further drainage of fluid. The catheter is left in place for 4 to 6 weeks. It is then removed by the healthcare provider.    Marsupialization. With this procedure, the area over the cyst is numbed. The cyst is cut and drained. The edges of the skin are then stitched to create a small opening that will allow for further drainage of fluid.  If you have recurrent cysts, other treatments may be needed. Your provider can tell you more about these options.  If your cyst is infected, your healthcare provider may prescribe antibiotics. Most infections of Bartholin s cysts are due to bacteria that are normally present in the vagina. Sometimes, the bacteria may have been passed to you during sex. This is called a sexually transmitted infection (STI). A test (culture) of the fluid can confirm if you have an STI.  Home care  Medicines    If antibiotics are prescribed, be sure to take them exactly as directed. Don t stop taking the medicine, even if you start to feel  better. If the cause of your infection is an STI, any sexual partners you have will also need to be tested and treated.    If you have pain, use over-the-counter pain medicine as directed. If needed, stronger pain medicines can be prescribed.   General care    To help relieve discomfort, you may be advised to take sitz baths for the next few days. For this, you soak in bath filled with 2 to 3 inches of warm water for 10 to 15 minutes, a few times a day.    Avoid having sex until the cyst has healed. If the cause of your infection is an STI, also avoid having sex until you and any partners you have are done with treatment.  Follow-up care  Follow up with your healthcare provider, or as directed. Be sure to keep all appointments. These are needed to ensure that you are recovering well after your treatment. If you have a catheter, your provider will let you know when to return to have it removed.  When to seek medical advice  Call your healthcare provider right away if any of these occur:    Fever does not go down or worsens    Increased redness, pain, swelling, or foul-smelling drainage from the cyst or the area around it    Pain in the lower abdomen     New rash or joint pain    Catheter falls out before the scheduled time to remove it  Date Last Reviewed: 11/1/2017 2000-2018 [employer]. [address] [place] [address]. All rights reserved. This information is not intended as a substitute for professional medical care. Always follow your healthcare professional's instructions.           Patient Education     Bacterial Vaginosis    You have a vaginal infection called bacterial vaginosis (BV). Both good and bad bacteria are present in a healthy vagina. BV occurs when these bacteria get out of balance. The number of bad bacteria increase. And the number of good bacteria decrease. Although BV is associated with sexual activity, it is not a sexually transmitted disease.  BV may or may not cause  symptoms. If symptoms do occur, they can include:    Thin, gray, milky-white, or sometimes green discharge    Unpleasant odor or  fishy  smell    Itching, burning, or pain in or around the vagina  It is not known what causes BV, but certain factors can make the problem more likely. This can include:    Douching    Having sex with a new partner    Having sex with more than one partner  BV will sometimes go away on its own. But treatment is usually recommended. This is because untreated BV can increase the risk of more serious health problems such as:    Pelvic inflammatory disease (PID)     delivery (giving birth to a baby early if you re pregnant)    HIV and certain other sexually transmitted diseases (STDs)    Infection after surgery on the reproductive organs  Home care  General care    BV is most often treated with medicines called antibiotics. These may be given as pills or as a vaginal cream. If antibiotics are prescribed, be sure to use them exactly as directed. Also, be sure to complete all of the medicine, even if your symptoms go away.    Don't douche or having sex during treatment.    If you have sex with a female partner, ask your healthcare provider if she should also be treated.  Prevention    Don't douche.    Don't have sex. If you do have sex, then take steps to lower your risk:  ? Use condoms when having sex.  ? Limit the number of sexual partners you have.  Follow-up care  Follow up with your healthcare provider, or as advised.  When to seek medical advice  Call your healthcare provider right away if:    You have a fever of 100.4 F (38 C) or higher, or as directed by your provider.    Your symptoms worsen, or they don t go away within a few days of starting treatment.    You have new pain in the lower belly or pelvic region.    You have side effects that bother you or a reaction to the pills or cream you re prescribed.    You or any partners you have sex with have new symptoms, such as a  rash, joint pain, or sores.  Date Last Reviewed: 10/1/2017    6969-0685 [employer]. [address] [place] [address], [place] [address]. All rights reserved. This information is not intended as a substitute for professional medical care. Always follow your healthcare professional's instructions.

## 2019-12-10 ENCOUNTER — OFFICE VISIT (OUTPATIENT)
Dept: OBGYN | Facility: CLINIC | Age: 34
End: 2019-12-10
Payer: COMMERCIAL

## 2019-12-10 VITALS
HEIGHT: 61 IN | DIASTOLIC BLOOD PRESSURE: 54 MMHG | SYSTOLIC BLOOD PRESSURE: 96 MMHG | WEIGHT: 92 LBS | BODY MASS INDEX: 17.37 KG/M2

## 2019-12-10 DIAGNOSIS — N80.9 ENDOMETRIOSIS: Primary | ICD-10-CM

## 2019-12-10 PROCEDURE — 99024 POSTOP FOLLOW-UP VISIT: CPT | Performed by: OBSTETRICS & GYNECOLOGY

## 2019-12-10 RX ORDER — NORETHINDRONE ACETATE AND ETHINYL ESTRADIOL 1.5-30(21)
1 KIT ORAL DAILY
Qty: 90 TABLET | Refills: 3 | Status: SHIPPED | OUTPATIENT
Start: 2019-12-10

## 2019-12-10 ASSESSMENT — MIFFLIN-ST. JEOR: SCORE: 1064.69

## 2019-12-10 NOTE — PROGRESS NOTES
The patient is seen for a postoperative check at this time.  She has healed very well.  We discussed suppressive protocols which I think she will go back on at this time.  We will see her in 3 months.

## 2019-12-26 ENCOUNTER — TELEPHONE (OUTPATIENT)
Dept: OBGYN | Facility: CLINIC | Age: 34
End: 2019-12-26

## 2019-12-26 ENCOUNTER — OFFICE VISIT (OUTPATIENT)
Dept: URGENT CARE | Facility: URGENT CARE | Age: 34
End: 2019-12-26
Payer: COMMERCIAL

## 2019-12-26 VITALS
DIASTOLIC BLOOD PRESSURE: 69 MMHG | SYSTOLIC BLOOD PRESSURE: 104 MMHG | WEIGHT: 93 LBS | TEMPERATURE: 98.4 F | HEART RATE: 74 BPM | OXYGEN SATURATION: 99 % | BODY MASS INDEX: 17.57 KG/M2

## 2019-12-26 DIAGNOSIS — B37.31 CANDIDIASIS OF VAGINA: ICD-10-CM

## 2019-12-26 DIAGNOSIS — B37.0 THRUSH: ICD-10-CM

## 2019-12-26 DIAGNOSIS — R30.0 DYSURIA: ICD-10-CM

## 2019-12-26 DIAGNOSIS — N89.8 VAGINAL DISCHARGE: ICD-10-CM

## 2019-12-26 DIAGNOSIS — N30.01 ACUTE CYSTITIS WITH HEMATURIA: Primary | ICD-10-CM

## 2019-12-26 LAB
ALBUMIN UR-MCNC: NEGATIVE MG/DL
APPEARANCE UR: CLEAR
BACTERIA #/AREA URNS HPF: ABNORMAL /HPF
BILIRUB UR QL STRIP: NEGATIVE
COLOR UR AUTO: YELLOW
GLUCOSE UR STRIP-MCNC: NEGATIVE MG/DL
HGB UR QL STRIP: ABNORMAL
KETONES UR STRIP-MCNC: NEGATIVE MG/DL
LEUKOCYTE ESTERASE UR QL STRIP: ABNORMAL
NITRATE UR QL: NEGATIVE
NON-SQ EPI CELLS #/AREA URNS LPF: ABNORMAL /LPF
PH UR STRIP: 7 PH (ref 5–7)
RBC #/AREA URNS AUTO: ABNORMAL /HPF
SOURCE: ABNORMAL
SP GR UR STRIP: <=1.005 (ref 1–1.03)
SPECIMEN SOURCE: ABNORMAL
UROBILINOGEN UR STRIP-ACNC: 0.2 EU/DL (ref 0.2–1)
WBC #/AREA URNS AUTO: ABNORMAL /HPF
WET PREP SPEC: ABNORMAL

## 2019-12-26 PROCEDURE — 81001 URINALYSIS AUTO W/SCOPE: CPT | Performed by: PHYSICIAN ASSISTANT

## 2019-12-26 PROCEDURE — 99214 OFFICE O/P EST MOD 30 MIN: CPT | Performed by: PHYSICIAN ASSISTANT

## 2019-12-26 PROCEDURE — 87210 SMEAR WET MOUNT SALINE/INK: CPT | Performed by: PHYSICIAN ASSISTANT

## 2019-12-26 RX ORDER — NYSTATIN 100000/ML
500000 SUSPENSION, ORAL (FINAL DOSE FORM) ORAL 4 TIMES DAILY
Qty: 140 ML | Refills: 0 | Status: SHIPPED | OUTPATIENT
Start: 2019-12-26 | End: 2020-01-31

## 2019-12-26 RX ORDER — METRONIDAZOLE 500 MG/1
500 TABLET ORAL 3 TIMES DAILY
Qty: 21 TABLET | Refills: 0 | Status: SHIPPED | OUTPATIENT
Start: 2019-12-26 | End: 2019-12-26

## 2019-12-26 RX ORDER — NITROFURANTOIN 25; 75 MG/1; MG/1
100 CAPSULE ORAL 2 TIMES DAILY
Qty: 10 CAPSULE | Refills: 0 | Status: SHIPPED | OUTPATIENT
Start: 2019-12-26 | End: 2020-01-31

## 2019-12-26 RX ORDER — FLUCONAZOLE 150 MG/1
150 TABLET ORAL DAILY
Qty: 3 TABLET | Refills: 0 | Status: SHIPPED | OUTPATIENT
Start: 2019-12-26 | End: 2020-01-31

## 2019-12-26 ASSESSMENT — ENCOUNTER SYMPTOMS
RESPIRATORY NEGATIVE: 1
CONSTITUTIONAL NEGATIVE: 1
MUSCULOSKELETAL NEGATIVE: 1
EYES NEGATIVE: 1
CARDIOVASCULAR NEGATIVE: 1
DYSURIA: 1

## 2019-12-26 NOTE — PATIENT INSTRUCTIONS
Patient Education     Bacterial Vaginosis    You have a vaginal infection called bacterial vaginosis (BV). Both good and bad bacteria are present in a healthy vagina. BV occurs when these bacteria get out of balance. The number of bad bacteria increase. And the number of good bacteria decrease. Although BV is associated with sexual activity, it is not a sexually transmitted disease.  BV may or may not cause symptoms. If symptoms do occur, they can include:    Thin, gray, milky-white, or sometimes green discharge    Unpleasant odor or  fishy  smell    Itching, burning, or pain in or around the vagina  It is not known what causes BV, but certain factors can make the problem more likely. This can include:    Douching    Having sex with a new partner    Having sex with more than one partner  BV will sometimes go away on its own. But treatment is usually recommended. This is because untreated BV can increase the risk of more serious health problems such as:    Pelvic inflammatory disease (PID)     delivery (giving birth to a baby early if you re pregnant)    HIV and certain other sexually transmitted diseases (STDs)    Infection after surgery on the reproductive organs  Home care  General care    BV is most often treated with medicines called antibiotics. These may be given as pills or as a vaginal cream. If antibiotics are prescribed, be sure to use them exactly as directed. Also, be sure to complete all of the medicine, even if your symptoms go away.    Don't douche or having sex during treatment.    If you have sex with a female partner, ask your healthcare provider if she should also be treated.  Prevention    Don't douche.    Don't have sex. If you do have sex, then take steps to lower your risk:  ? Use condoms when having sex.  ? Limit the number of sexual partners you have.  Follow-up care  Follow up with your healthcare provider, or as advised.  When to seek medical advice  Call your healthcare provider  right away if:    You have a fever of 100.4 F (38 C) or higher, or as directed by your provider.    Your symptoms worsen, or they don t go away within a few days of starting treatment.    You have new pain in the lower belly or pelvic region.    You have side effects that bother you or a reaction to the pills or cream you re prescribed.    You or any partners you have sex with have new symptoms, such as a rash, joint pain, or sores.  Date Last Reviewed: 10/1/2017    3779-9852 [employer]. [address] [place], [place] [id number]. All rights reserved. This information is not intended as a substitute for professional medical care. Always follow your healthcare professional's instructions.           Patient Education     Urinary Tract Infections in Women    Urinary tract infections (UTIs) are most often caused by bacteria. These bacteria enter the urinary tract. The bacteria may come from outside the body. Or they may travel from the skin outside the rectum or vagina into the urethra. Female anatomy makes it easy for bacteria from the bowel to enter a woman s urinary tract, which is the most common source of UTI. This means women develop UTIs more often than men. Pain in or around the urinary tract is a common UTI symptom. But the only way to know for sure if you have a UTI for the healthcare provider to test your urine. The two tests that may be done are the urinalysis and urine culture.  Types of UTIs    Cystitis. A bladder infection (cystitis) is the most common UTI in women. You may have urgent or frequent urination. You may also have pain, burning when you urinate, and bloody urine.    Urethritis. This is an inflamed urethra, which is the tube that carries urine from the bladder to outside the body. You may have lower stomach or back pain. You may also have urgent or frequent urination.    Pyelonephritis. This is a kidney infection. If not treated, it can be serious and damage your kidneys. In  severe cases, you may need to stay in the hospital. You may have a fever and lower back pain.  Medicines to treat a UTI  Most UTIs are treated with antibiotics. These kill the bacteria. The length of time you need to take them depends on the type of infection. It may be as short as 3 days. If you have repeated UTIs, you may need a low-dose antibiotic for several months. Take antibiotics exactly as directed. Don t stop taking them until all of the medicine is gone. If you stop taking the antibiotic too soon, the infection may not go away. You may also develop a resistance to the antibiotic. This can make it much harder to treat.  Lifestyle changes to treat and prevent UTIs  The lifestyle changes below will help get rid of your UTI. They may also help prevent future UTIs.    Drink plenty of fluids. This includes water, juice, or other caffeine-free drinks. Fluids help flush bacteria out of your body.    Empty your bladder. Always empty your bladder when you feel the urge to urinate. And always urinate before going to sleep. Urine that stays in your bladder can lead to infection. Try to urinate before and after sex as well.    Practice good personal hygiene. Wipe yourself from front to back after using the toilet. This helps keep bacteria from getting into the urethra.    Use condoms during sex. These help prevent UTIs caused by sexually transmitted bacteria. Also don't use spermicides during sex. These can increase the risk for UTIs. Choose other forms of birth control instead. For women who tend to get UTIs after sex, a low-dose of a preventive antibiotic may be used. Be sure to discuss this option with your healthcare provider.    Follow up with your healthcare provider as directed. He or she may test to make sure the infection has cleared. If needed, more treatment may be started.  Date Last Reviewed: 1/1/2017 2000-2018 [employer]. [address] [place] [address], [place] [address]. All rights reserved.  This information is not intended as a substitute for professional medical care. Always follow your healthcare professional's instructions.           Patient Education     Vaginal Infection: Yeast (Candidiasis)  Yeast infection occurs when yeast in the vagina increase and attacks the vaginal tissues. Yeast is a type of fungus. These infections are often caused by a type of yeast called Candida albicans. Other species of yeast can also cause infections. Factors that may make infection more likely include recent antibiotic use, douching, or increased sex. Yeast infections are more common in women who have diabetes, or are obese or pregnant, or have a weak immune system.  Symptoms of yeast infection    Clumpy or thin, white discharge, which may look like cottage cheese    No odor or minimal odor    Severe vaginal itching or burning    Burning with urination    Swelling, redness of vulva    Pain during sex  Treating yeast infection  Yeast infection is treated with a vaginal antifungal cream. In some cases, antifungal pills are prescribed instead. During treatment:    Finish all of your medicine, even if your symptoms go away.    Apply the cream before going to bed. Lie flat after applying so that it doesn't drip out.    Do not douche or use tampons.    Don't rely on a diaphragm or condoms, since the cream may weaken them.    Avoid intercourse if advised by your healthcare provider.     Should I treat a yeast infection myself?  Discuss with your healthcare provider whether you should use over-the-counter medicines to treat a yeast infection. Self-treatment may depend on whether:    You've had a yeast infection in the past.    You're at risk for STDs.  Call your healthcare provider if symptoms do not go away or come back after treatment.   Date Last Reviewed: 3/1/2017    7298-9885 [employer]. [address] [place] [address], [place] [address]. All rights reserved. This information is not intended as a substitute for  professional medical care. Always follow your healthcare professional's instructions.

## 2019-12-26 NOTE — TELEPHONE ENCOUNTER
12/7/19 dx with BV at [facility]  Completed the course of abx/tx  Now still feeling itching and sore.  Denies fever or cramping/pain  Some occas pinching at incision sites.    No apts for further vaginal sx evaluation - recommended UC for eval but also tub soaks and probiotics. Call with any other questions.  Pt verbalized understanding, in agreement with plan, and voiced no further questions.  Christina Blackburn RN on 12/26/2019 at 3:46 PM

## 2019-12-26 NOTE — PROGRESS NOTES
SUBJECTIVE:   Afia Pierce is a 34 year old female presenting with a chief complaint of   Chief Complaint   Patient presents with     UTI     Patient complains of vaginal itching, pain, discharge and irritation        She is an established patient of [facility].  Patient here with vaginal discharge and white tongue coated.  Some dysuria.  She was treated for BV a month ago and felt she never really got better, fully.      GYN Complaint    Onset of symptoms was 4 day(s) ago.  Course of illness is same.    Severity moderate  Current and Associated symptoms: vaginal discharge described as white and yellow  Treatment measures tried include:  None  Sexually active: yes, single partner, contraception -  Predisposing factors: None  Hx of previous symptom: none and rare        Review of Systems   Constitutional: Negative.    HENT: Negative.         Tongue coating.   Eyes: Negative.    Respiratory: Negative.    Cardiovascular: Negative.    Genitourinary: Positive for dysuria and vaginal discharge.   Musculoskeletal: Negative.    Skin: Negative.    All other systems reviewed and are negative.      Past Medical History:   Diagnosis Date     GERD (gastroesophageal reflux disease)      NO ACTIVE PROBLEMS      Family History   Problem Relation Age of Onset     Diabetes Maternal Grandmother      Breast Cancer Maternal Aunt      Ovarian Cancer Maternal Aunt      Uterine Cancer Maternal Aunt      Current Outpatient Medications   Medication Sig Dispense Refill     colestipol (COLESTID) 1 g tablet Take 1 g by mouth       fluconazole (DIFLUCAN) 150 MG tablet Take 1 tablet (150 mg) by mouth daily for 3 days 3 tablet 0     MULTIPLE VITAMIN PO Take 2 tablets by mouth daily        nitroFURantoin macrocrystal-monohydrate (MACROBID) 100 MG capsule Take 1 capsule (100 mg) by mouth 2 times daily for 5 days 10 capsule 0     norethindrone-ethinyl estradiol-iron (MICROGESTIN FE1.5/30) 1.5-30 MG-MCG tablet Take 1 tablet by mouth daily 1 active pill  daily for continuous suppression 90 tablet 3     nystatin (MYCOSTATIN) 278817 UNIT/ML suspension Take 5 mLs (500,000 Units) by mouth 4 times daily for 7 days 140 mL 0     Omega-3 Fatty Acids (FISH OIL PO) Take 2 tablets by mouth daily        ondansetron (ZOFRAN) 4 MG tablet Take by mouth every 8 hours as needed for nausea       Probiotic Product (PROBIOTIC PO) Take 1 tablet by mouth daily       Social History     Tobacco Use     Smoking status: Never Smoker     Smokeless tobacco: Never Used   Substance Use Topics     Alcohol use: Not Currently       OBJECTIVE  /69 (BP Location: Left arm, Patient Position: Chair, Cuff Size: Adult Regular)   Pulse 74   Temp 98.4  F (36.9  C) (Oral)   Wt 42.2 kg (93 lb)   SpO2 99%   BMI 17.57 kg/m      Physical Exam  Vitals signs and nursing note reviewed.   Constitutional:       Appearance: Normal appearance. She is normal weight.   HENT:      Mouth/Throat:      Comments: White coating on tongue  Cardiovascular:      Rate and Rhythm: Normal rate and regular rhythm.   Pulmonary:      Effort: Pulmonary effort is normal.      Breath sounds: Normal breath sounds.   Abdominal:      Tenderness: There is no right CVA tenderness or left CVA tenderness.   Skin:     Capillary Refill: Capillary refill takes 2 to 3 seconds.   Neurological:      General: No focal deficit present.      Mental Status: She is alert.   Psychiatric:         Mood and Affect: Mood normal.         Labs:  Results for orders placed or performed in visit on 12/26/19 (from the past 24 hour(s))   *UA reflex to Microscopic and Culture ([facility] (except [place] and [place])   Result Value Ref Range    Color Urine Yellow     Appearance Urine Clear     Glucose Urine Negative NEG^Negative mg/dL    Bilirubin Urine Negative NEG^Negative    Ketones Urine Negative NEG^Negative mg/dL    Specific Gravity Urine <=1.005 1.003 - 1.035    Blood Urine Trace (A) NEG^Negative    pH Urine 7.0 5.0 - 7.0 pH     Protein Albumin Urine Negative NEG^Negative mg/dL    Urobilinogen Urine 0.2 0.2 - 1.0 EU/dL    Nitrite Urine Negative NEG^Negative    Leukocyte Esterase Urine Trace (A) NEG^Negative    Source Midstream Urine    Urine Microscopic   Result Value Ref Range    WBC Urine 0 - 5 OTO5^0 - 5 /HPF    RBC Urine O - 2 OTO2^O - 2 /HPF    Squamous Epithelial /LPF Urine Few FEW^Few /LPF    Bacteria Urine Moderate (A) NEG^Negative /HPF   Wet prep   Result Value Ref Range    Specimen Description Vagina     Wet Prep No Trichomonas seen     Wet Prep No clue cells seen     Wet Prep Yeast seen  Few   (A)     Wet Prep WBC'S seen  Few          X-Ray was not done.    ASSESSMENT:      ICD-10-CM    1. Acute cystitis with hematuria N30.01    2. Dysuria R30.0 *UA reflex to Microscopic and Culture ([facility] (except [place] and [place])     Urine Microscopic     nitroFURantoin macrocrystal-monohydrate (MACROBID) 100 MG capsule   3. Vaginal discharge N89.8 Wet prep     fluconazole (DIFLUCAN) 150 MG tablet   4. Thrush B37.0 nystatin (MYCOSTATIN) 320280 UNIT/ML suspension   5. Candidiasis of vagina B37.3         Medical Decision Making:    Differential Diagnosis:  Gyn Problem: Vaginitis:  yeast, bacterial vaginosis    Serious Comorbid Conditions:  Adult:  None    PLAN:    Gyn Problem:  Diflucan, nystatin swish and swallow, macrobid  Followup:    If not improving or if condition worsens, follow up with your Primary Care Provider    Patient Instructions       Patient Education     Bacterial Vaginosis    You have a vaginal infection called bacterial vaginosis (BV). Both good and bad bacteria are present in a healthy vagina. BV occurs when these bacteria get out of balance. The number of bad bacteria increase. And the number of good bacteria decrease. Although BV is associated with sexual activity, it is not a sexually transmitted disease.  BV may or may not cause symptoms. If symptoms do occur, they can include:    Thin, gray,  milky-white, or sometimes green discharge    Unpleasant odor or  fishy  smell    Itching, burning, or pain in or around the vagina  It is not known what causes BV, but certain factors can make the problem more likely. This can include:    Douching    Having sex with a new partner    Having sex with more than one partner  BV will sometimes go away on its own. But treatment is usually recommended. This is because untreated BV can increase the risk of more serious health problems such as:    Pelvic inflammatory disease (PID)     delivery (giving birth to a baby early if you re pregnant)    HIV and certain other sexually transmitted diseases (STDs)    Infection after surgery on the reproductive organs  Home care  General care    BV is most often treated with medicines called antibiotics. These may be given as pills or as a vaginal cream. If antibiotics are prescribed, be sure to use them exactly as directed. Also, be sure to complete all of the medicine, even if your symptoms go away.    Don't douche or having sex during treatment.    If you have sex with a female partner, ask your healthcare provider if she should also be treated.  Prevention    Don't douche.    Don't have sex. If you do have sex, then take steps to lower your risk:  ? Use condoms when having sex.  ? Limit the number of sexual partners you have.  Follow-up care  Follow up with your healthcare provider, or as advised.  When to seek medical advice  Call your healthcare provider right away if:    You have a fever of 100.4 F (38 C) or higher, or as directed by your provider.    Your symptoms worsen, or they don t go away within a few days of starting treatment.    You have new pain in the lower belly or pelvic region.    You have side effects that bother you or a reaction to the pills or cream you re prescribed.    You or any partners you have sex with have new symptoms, such as a rash, joint pain, or sores.  Date Last Reviewed: 10/1/2017     1728-1982 [employer]. [address] [place] [address], [place] [address]. All rights reserved. This information is not intended as a substitute for professional medical care. Always follow your healthcare professional's instructions.           Patient Education     Urinary Tract Infections in Women    Urinary tract infections (UTIs) are most often caused by bacteria. These bacteria enter the urinary tract. The bacteria may come from outside the body. Or they may travel from the skin outside the rectum or vagina into the urethra. Female anatomy makes it easy for bacteria from the bowel to enter a woman s urinary tract, which is the most common source of UTI. This means women develop UTIs more often than men. Pain in or around the urinary tract is a common UTI symptom. But the only way to know for sure if you have a UTI for the healthcare provider to test your urine. The two tests that may be done are the urinalysis and urine culture.  Types of UTIs    Cystitis. A bladder infection (cystitis) is the most common UTI in women. You may have urgent or frequent urination. You may also have pain, burning when you urinate, and bloody urine.    Urethritis. This is an inflamed urethra, which is the tube that carries urine from the bladder to outside the body. You may have lower stomach or back pain. You may also have urgent or frequent urination.    Pyelonephritis. This is a kidney infection. If not treated, it can be serious and damage your kidneys. In severe cases, you may need to stay in the hospital. You may have a fever and lower back pain.  Medicines to treat a UTI  Most UTIs are treated with antibiotics. These kill the bacteria. The length of time you need to take them depends on the type of infection. It may be as short as 3 days. If you have repeated UTIs, you may need a low-dose antibiotic for several months. Take antibiotics exactly as directed. Don t stop taking them until all of the medicine is gone. If you  stop taking the antibiotic too soon, the infection may not go away. You may also develop a resistance to the antibiotic. This can make it much harder to treat.  Lifestyle changes to treat and prevent UTIs  The lifestyle changes below will help get rid of your UTI. They may also help prevent future UTIs.    Drink plenty of fluids. This includes water, juice, or other caffeine-free drinks. Fluids help flush bacteria out of your body.    Empty your bladder. Always empty your bladder when you feel the urge to urinate. And always urinate before going to sleep. Urine that stays in your bladder can lead to infection. Try to urinate before and after sex as well.    Practice good personal hygiene. Wipe yourself from front to back after using the toilet. This helps keep bacteria from getting into the urethra.    Use condoms during sex. These help prevent UTIs caused by sexually transmitted bacteria. Also don't use spermicides during sex. These can increase the risk for UTIs. Choose other forms of birth control instead. For women who tend to get UTIs after sex, a low-dose of a preventive antibiotic may be used. Be sure to discuss this option with your healthcare provider.    Follow up with your healthcare provider as directed. He or she may test to make sure the infection has cleared. If needed, more treatment may be started.  Date Last Reviewed: 1/1/2017 2000-2018 [employer]. [address] [place] [address]. All rights reserved. This information is not intended as a substitute for professional medical care. Always follow your healthcare professional's instructions.           Patient Education     Vaginal Infection: Yeast (Candidiasis)  Yeast infection occurs when yeast in the vagina increase and attacks the vaginal tissues. Yeast is a type of fungus. These infections are often caused by a type of yeast called Candida albicans. Other species of yeast can also cause infections. Factors that may make  infection more likely include recent antibiotic use, douching, or increased sex. Yeast infections are more common in women who have diabetes, or are obese or pregnant, or have a weak immune system.  Symptoms of yeast infection    Clumpy or thin, white discharge, which may look like cottage cheese    No odor or minimal odor    Severe vaginal itching or burning    Burning with urination    Swelling, redness of vulva    Pain during sex  Treating yeast infection  Yeast infection is treated with a vaginal antifungal cream. In some cases, antifungal pills are prescribed instead. During treatment:    Finish all of your medicine, even if your symptoms go away.    Apply the cream before going to bed. Lie flat after applying so that it doesn't drip out.    Do not douche or use tampons.    Don't rely on a diaphragm or condoms, since the cream may weaken them.    Avoid intercourse if advised by your healthcare provider.     Should I treat a yeast infection myself?  Discuss with your healthcare provider whether you should use over-the-counter medicines to treat a yeast infection. Self-treatment may depend on whether:    You've had a yeast infection in the past.    You're at risk for STDs.  Call your healthcare provider if symptoms do not go away or come back after treatment.   Date Last Reviewed: 3/1/2017    [id number] [employer]. [address] [place] [address], [place] [address]. All rights reserved. This information is not intended as a substitute for professional medical care. Always follow your healthcare professional's instructions.

## 2019-12-28 ENCOUNTER — NURSE TRIAGE (OUTPATIENT)
Dept: NURSING | Facility: CLINIC | Age: 34
End: 2019-12-28

## 2019-12-28 NOTE — TELEPHONE ENCOUNTER
"Patient states was seen in Urgent Care 12/26/19 and was prescribed 3 medications. States was given \"4\" at the pharmacy when she picked up medications yesterday 12/27/19.   Patient states she also received \"metronidazole.\"     Per Epic chart this RN reviewed the UC visit 12/26/19 Provider's Plan with Patient.     Office Visit     12/26/2019  [facility] Shreya Johnston PA-C   Physician Assistant   Acute cystitis with hematuria +4 more   Dx   UTI     Reason for Visit      PLAN:  Gyn Problem:  Diflucan, nystatin swish and swallow, macrobid  Followup:   If not improving or if condition worsens, follow up with your Primary Care Provider.    This RN called Pharmacy [phone] and spoke with Pharmacist (Tommy). States \"metronidazole\" was also prescribed.   Per Epic chart this RN notes order for Metronidazole was Discontinued 12/26/19. Pharmacist states does not see the order for \"Discontinued.\"    Advised Patient that the order for Metronidazole states \"Discontinued\" in Epic Medications Outpatient medication Detail 12/26/19.   Advised Caller to return to Urgent Care today or tomorrow to clarify prescription and plan of care.   Also advised Patient to follow up with her PCP 12/30/19.  Patient verbalizes understanding and agrees to plan.     KUNAL Landrum RN   [facility]     Reason for Disposition    Caller has NON-URGENT medication question about med that PCP prescribed and triager unable to answer question    Protocols used: MEDICATION QUESTION CALL-A-AH    "

## 2019-12-29 ENCOUNTER — NURSE TRIAGE (OUTPATIENT)
Dept: NURSING | Facility: CLINIC | Age: 34
End: 2019-12-29

## 2020-01-30 NOTE — PROGRESS NOTES
SUBJECTIVE: Afia Pierce is a 34 year old female who presents today with UTI symptoms: Burning, itching (annoyance), pain while urinating.    URINARY TRACT SYMPTOMS     Onset:  A few years off and on. During the past month symptoms resolved then returned    Description:   Painful urination (Dysuria): Yes  Blood in urine (Hematuria): No  Urgency/Frequency: No    Progression of Symptoms:  waxing and waning    Accompanying Signs & Symptoms:  Fever/chills: No  Flank pain: No  Nausea and vomiting: No  Any vaginal symptoms: Yes  Abdominal/Pelvic Pain: Yes   History:   History of frequent UTI's: Yes  History of kidney stones: No  Sexually Active: Yes  Possibility of pregnancy: No  Contraceptive type:  none    Precipitating factors:            Therapies Tried and outcome:     Vaginal discharge is described as: Fishy smell. White, gooey/ musousy, and smooth. Been occurring off and on for the past few months. She has been tested and has had yeast and BV. Her  is concerned that they are passing it back and forth. He is concerned that he has thrush.       She declined STI testing.       Patient Active Problem List   Diagnosis     Posterior tibial tendon dysfunction     Bilateral plantar fasciitis     Pelvic pain     Past Medical History:   Diagnosis Date     GERD (gastroesophageal reflux disease)      NO ACTIVE PROBLEMS      Past Surgical History:   Procedure Laterality Date     COLONOSCOPY       [facility] UGI ENDOSCOPY, SIMPLE EXAM       LAPAROSCOPIC CYSTECTOMY OVARIAN (BENIGN)  11/21/2019    Procedure: Laparoscopic cystectomy ovarian (benign);  Surgeon: Marvin Solomon MD;  Location:  OR     LASER CO2 LAPAROSCOPIC VAPORIZATION ENDOMETRIUM N/A 11/21/2019    Procedure: LAPAROSCOPY, DIAGNOSTIC, WITH VAPORIZATION OF ENDOMETROSIS USING CO2 LASER;  Surgeon: Marvin Solomon MD;  Location:  OR     Current Outpatient Medications   Medication Sig Dispense Refill     colestipol (COLESTID) 1 g tablet Take 1 g by mouth        "MULTIPLE VITAMIN PO Take 2 tablets by mouth daily        Omega-3 Fatty Acids (FISH OIL PO) Take 2 tablets by mouth daily        norethindrone-ethinyl estradiol-iron (MICROGESTIN FE1.5/30) 1.5-30 MG-MCG tablet Take 1 tablet by mouth daily 1 active pill daily for continuous suppression (Patient not taking: Reported on 1/31/2020) 90 tablet 3     ondansetron (ZOFRAN) 4 MG tablet Take by mouth every 8 hours as needed for nausea       Probiotic Product (PROBIOTIC PO) Take 1 tablet by mouth daily       Allergies   Allergen Reactions     Sulfa Drugs Other (See Comments)     GI Upset     Diagnostic X-Ray Materials Itching     Mild itching and hives  Mild itching and hives  CT dye       Health maintenance updated: yes - pap due in 2023    ROS:  12 point review of systems negative other than symptoms noted below or in the HPI.  Constitutional: Fatigue  Gastrointestinal: Constipation, Heartburn and Nausea  Genitourinary: Painful [place], Painful Urination, Pelvic Pain, Vaginal Discharge, Vaginal Dryness and Vaginal Itching    PHYSICAL EXAM:  Vitals: BP 92/64   Pulse 74   Ht 1.575 m (5' 2\")   Wt 41.7 kg (92 lb)   LMP 01/06/2020 (Exact Date)   Breastfeeding No   BMI 16.83 kg/m    BMI= Body mass index is 16.83 kg/m .  Patient appears well, afebrile.   ABD: Soft without supra pubic pain or tenderness  BACK: Neg CVAT.     No components found for: URINE        ASSESSMENT/PLAN:      ICD-10-CM    1. Dysuria R30.0 UA with Microscopic   2. Itching of vagina N89.8 Wet  Prep     Yeast culture     Gram stain     Group B strep PCR     Results for orders placed or performed in visit on 01/31/20   UA with Microscopic     Status: None   Result Value Ref Range    Color Urine Yellow     Appearance Urine Clear     Glucose Urine Negative NEG^Negative mg/dL    Bilirubin Urine Negative NEG^Negative    Ketones Urine Negative NEG^Negative mg/dL    Specific Gravity Urine 1.010 1.003 - 1.035    pH Urine 7.0 5.0 - 7.0 pH    Protein Albumin Urine " Negative NEG^Negative mg/dL    Urobilinogen Urine 0.2 0.2 - 1.0 EU/dL    Nitrite Urine Negative NEG^Negative    Blood Urine Negative NEG^Negative    Leukocyte Esterase Urine Negative NEG^Negative    Source Midstream Urine     WBC Urine 0 - 5 OTO5^0 - 5 /HPF    RBC Urine O - 2 OTO2^O - 2 /HPF   Wet  Prep     Status: None   Result Value Ref Range    Specimen Description Vagina     Wet Prep No Trichomonas seen     Wet Prep No clue cells seen     Wet Prep No yeast seen     Wet Prep No WBC's seen    Yeast culture     Status: None   Result Value Ref Range    Specimen Description Vagina     Special Requests Specimen collected in eSwab transport (white cap)     Culture Micro No yeast isolated    Gram stain     Status: Abnormal   Result Value Ref Range    Specimen Description Vagina     Gram Stain Billie score  2  Normal vaginal brooke       Gram Stain Few  WBC'S seen       Gram Stain       Diagnosis for bacterial vaginosis is done by Gram stain only per the recommendation of   National regulatory bodies.  A routine bacterial culture will not be done.  If clinically   relevant, a Group B Streptococcus and/or yeast culture should be requested separately      Gram Stain (A)      Presence of purulence suggests the possible presence of another infection and/or   inflammatory condition. Correlate with clinical picture. Testing for N.gonorrhoeae,   C.trachomatis and/or T.vaginalis may be indicated.     Group B strep PCR     Status: None   Result Value Ref Range    Group B Strep PCR Spec Ziggy Vaginal Rectal     Group B Strep PCR Negative NEG^Negative          Urine was not sent for culture.     COUNSELING:  We discussed sending cultures in addition to wet prep for a better diagnosis. Wet prep was negative. If positive in cultures we will treat her.     Recommended her  see primary care to determine if he has thrush.       Fernando Schumacher, DNP, APRN, CNM

## 2020-01-31 ENCOUNTER — OFFICE VISIT (OUTPATIENT)
Dept: MIDWIFE SERVICES | Facility: CLINIC | Age: 35
End: 2020-01-31
Payer: COMMERCIAL

## 2020-01-31 VITALS
HEIGHT: 62 IN | SYSTOLIC BLOOD PRESSURE: 92 MMHG | BODY MASS INDEX: 16.93 KG/M2 | WEIGHT: 92 LBS | HEART RATE: 74 BPM | DIASTOLIC BLOOD PRESSURE: 64 MMHG

## 2020-01-31 DIAGNOSIS — R30.0 DYSURIA: Primary | ICD-10-CM

## 2020-01-31 DIAGNOSIS — N89.8 ITCHING OF VAGINA: ICD-10-CM

## 2020-01-31 LAB
ALBUMIN UR-MCNC: NEGATIVE MG/DL
APPEARANCE UR: CLEAR
BILIRUB UR QL STRIP: NEGATIVE
COLOR UR AUTO: YELLOW
GLUCOSE UR STRIP-MCNC: NEGATIVE MG/DL
GRAM STN SPEC: ABNORMAL
HGB UR QL STRIP: NEGATIVE
KETONES UR STRIP-MCNC: NEGATIVE MG/DL
LEUKOCYTE ESTERASE UR QL STRIP: NEGATIVE
NITRATE UR QL: NEGATIVE
PH UR STRIP: 7 PH (ref 5–7)
RBC #/AREA URNS AUTO: NORMAL /HPF
SOURCE: NORMAL
SP GR UR STRIP: 1.01 (ref 1–1.03)
SPECIMEN SOURCE: ABNORMAL
SPECIMEN SOURCE: NORMAL
UROBILINOGEN UR STRIP-ACNC: 0.2 EU/DL (ref 0.2–1)
WBC #/AREA URNS AUTO: NORMAL /HPF
WET PREP SPEC: NORMAL

## 2020-01-31 PROCEDURE — 87205 SMEAR GRAM STAIN: CPT | Performed by: ADVANCED PRACTICE MIDWIFE

## 2020-01-31 PROCEDURE — 81001 URINALYSIS AUTO W/SCOPE: CPT | Performed by: ADVANCED PRACTICE MIDWIFE

## 2020-01-31 PROCEDURE — 87653 STREP B DNA AMP PROBE: CPT | Performed by: ADVANCED PRACTICE MIDWIFE

## 2020-01-31 PROCEDURE — 87102 FUNGUS ISOLATION CULTURE: CPT | Performed by: ADVANCED PRACTICE MIDWIFE

## 2020-01-31 PROCEDURE — 87210 SMEAR WET MOUNT SALINE/INK: CPT | Performed by: ADVANCED PRACTICE MIDWIFE

## 2020-01-31 PROCEDURE — 99213 OFFICE O/P EST LOW 20 MIN: CPT | Performed by: ADVANCED PRACTICE MIDWIFE

## 2020-01-31 ASSESSMENT — MIFFLIN-ST. JEOR: SCORE: 1080.56

## 2020-01-31 NOTE — RESULT ENCOUNTER NOTE
Left message over the phone with Juancho. Wet prep and UA negative. Will await for cultures to come back and then come up with a plan of care.     Fernando Schumacher, DNP, APRN, CNM

## 2020-02-01 LAB
GP B STREP DNA SPEC QL NAA+PROBE: NEGATIVE
SPECIMEN SOURCE: NORMAL

## 2020-02-04 LAB
Lab: NORMAL
SPECIMEN SOURCE: NORMAL
YEAST SPEC QL CULT: NORMAL

## 2020-02-04 NOTE — RESULT ENCOUNTER NOTE
I left Afia a voicemail and informed her of negative result of yeast culture. Patient to return to clinic if symptoms persist.    GUY Douglass CNM

## 2020-02-15 ENCOUNTER — OFFICE VISIT (OUTPATIENT)
Dept: URGENT CARE | Facility: URGENT CARE | Age: 35
End: 2020-02-15
Payer: COMMERCIAL

## 2020-02-15 VITALS
SYSTOLIC BLOOD PRESSURE: 112 MMHG | TEMPERATURE: 98.3 F | OXYGEN SATURATION: 99 % | WEIGHT: 93 LBS | BODY MASS INDEX: 17.01 KG/M2 | HEART RATE: 88 BPM | RESPIRATION RATE: 20 BRPM | DIASTOLIC BLOOD PRESSURE: 78 MMHG

## 2020-02-15 DIAGNOSIS — J02.9 SORE THROAT: ICD-10-CM

## 2020-02-15 DIAGNOSIS — H69.93 DYSFUNCTION OF BOTH EUSTACHIAN TUBES: Primary | ICD-10-CM

## 2020-02-15 DIAGNOSIS — H93.8X3 SENSATION OF PLUGGED EAR ON BOTH SIDES: ICD-10-CM

## 2020-02-15 LAB
DEPRECATED S PYO AG THROAT QL EIA: NORMAL
SPECIMEN SOURCE: NORMAL

## 2020-02-15 PROCEDURE — 99213 OFFICE O/P EST LOW 20 MIN: CPT | Performed by: PHYSICIAN ASSISTANT

## 2020-02-15 PROCEDURE — 87880 STREP A ASSAY W/OPTIC: CPT | Performed by: PHYSICIAN ASSISTANT

## 2020-02-15 PROCEDURE — 87081 CULTURE SCREEN ONLY: CPT | Performed by: PHYSICIAN ASSISTANT

## 2020-02-15 RX ORDER — CETIRIZINE HYDROCHLORIDE 10 MG/1
10 TABLET ORAL DAILY
Qty: 30 TABLET | Refills: 0 | Status: SHIPPED | OUTPATIENT
Start: 2020-02-15 | End: 2020-03-16

## 2020-02-15 RX ORDER — FLUTICASONE PROPIONATE 50 MCG
1 SPRAY, SUSPENSION (ML) NASAL DAILY
Qty: 9.9 ML | Refills: 0 | Status: SHIPPED | OUTPATIENT
Start: 2020-02-15 | End: 2020-02-25

## 2020-02-15 NOTE — PROGRESS NOTES
S: 34-year-old female presents for evaluation of burning sensation in her throat for a few weeks.  Also plugged ear sensation.  Does have history of seasonal allergies but is not currently taking any medications for it.  Denies any watery itchy eyes.  Has noted some sneezing.  No asthma.  Allergies in the spring and summer but also allergies to dust.  She denies any cough.  She denies any acid taste in her mouth.  No chest pain or shortness of breath.  Has some lower abdominal discomfort secondary to endometriosis.  No dysuria, frequency or urgency.  No fever.  No nausea or bottom vomiting.  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  Feels like the skin on her entire body is sensitive to touch.    Allergies   Allergen Reactions     Sulfa Drugs Other (See Comments)     GI Upset     Diagnostic X-Ray Materials Itching     Mild itching and hives  Mild itching and hives  CT dye       Past Medical History:   Diagnosis Date     GERD (gastroesophageal reflux disease)      NO ACTIVE PROBLEMS        colestipol (COLESTID) 1 g tablet, Take 1 g by mouth  MULTIPLE VITAMIN PO, Take 2 tablets by mouth daily   Multiple Vitamins-Minerals (PRESERVISION AREDS 2 PO),   Omega-3 Fatty Acids (FISH OIL PO), Take 2 tablets by mouth daily   Probiotic Product (PROBIOTIC PO), Take 1 tablet by mouth daily  norethindrone-ethinyl estradiol-iron (MICROGESTIN FE1.5/30) 1.5-30 MG-MCG tablet, Take 1 tablet by mouth daily 1 active pill daily for continuous suppression (Patient not taking: Reported on 1/31/2020)  ondansetron (ZOFRAN) 4 MG tablet, Take by mouth every 8 hours as needed for nausea    No current facility-administered medications on file prior to visit.       Social History     Tobacco Use     Smoking status: Never Smoker     Smokeless tobacco: Never Used   Substance Use Topics     Alcohol use: Not Currently       ROS:  CONSTITUTIONAL: Negative for fatigue or fever.  EYES: Negative for eye problems.  ENT: As above.  RESP:  As above.  CV: Negative for chest pains.  GI: Negative for vomiting.  MUSCULOSKELETAL:  Negative for significant muscle or joint pains.  NEUROLOGIC: Negative for headaches.  SKIN: Negative for rash.  PSYCH: Normal mentation for age.    OBJECTIVE:  /78 (BP Location: Left arm, Patient Position: Chair, Cuff Size: Adult Small)   Pulse 88   Temp 98.3  F (36.8  C) (Oral)   Resp 20   Wt 42.2 kg (93 lb)   SpO2 99%   Breastfeeding No   BMI 17.01 kg/m    GENERAL APPEARANCE: Healthy, alert and no distress.  EYES:Conjunctiva/sclera clear.  EARS: No cerumen.   Ear canals w/o erythema.  TM's intact w/o erythema.    NOSE/MOUTH: Nose without ulcers, erythema or lesions.  SINUSES: No maxillary sinus tenderness.  THROAT: Mild erythema w/o tonsillar enlargement . No exudates.  NECK: Supple, nontender, no lymphadenopathy.  RESP: Lungs clear to auscultation - no rales, rhonchi or wheezes  CV: Regular rate and rhythm, normal S1 S2, no murmur noted.  NEURO: Awake, alert    SKIN: No rashes  Abdomen-soft, mild diffuse lower abdominal tenderness.  Normal active bowel sounds.  Results for orders placed or performed in visit on 02/15/20   Rapid strep screen     Status: None   Result Value Ref Range    Specimen Description Throat     Rapid Strep A Screen       NEGATIVE: No Group A streptococcal antigen detected by immunoassay, await culture report.         ASSESSMENT:     ICD-10-CM    1. Dysfunction of both eustachian tubes H69.83 cetirizine (ZYRTEC) 10 MG tablet     fluticasone (FLONASE) 50 MCG/ACT nasal spray   2. Sore throat J02.9 Rapid strep screen     cetirizine (ZYRTEC) 10 MG tablet     fluticasone (FLONASE) 50 MCG/ACT nasal spray   3. Sensation of plugged ear on both sides H93.8X3 cetirizine (ZYRTEC) 10 MG tablet     fluticasone (FLONASE) 50 MCG/ACT nasal spray           PLAN:F/U PCP 2 weeks if not better.  I have discussed clinical findings with patient.  Side effects of medications discussed.  Symptomatic care is  discussed.  I have discussed the possibility of  worsening symptoms and indication to RTC or go to the ER if they occur.  All questions are answered, patient indicates understanding of these issues and is in agreement with plan.   Patient care instructions are discussed/given at the end of visit.   Lots of rest and fluids.    Slime Gomes PA-C

## 2020-02-15 NOTE — LETTER
February 16, 2020      Afia Tomasnz  [address]   [place] [address]        Dear ,    We are writing to inform you of your test results.     Your test results are normal- negative throat culture. Please continue with current treatment plan. Enclosed is a copy of these results.    If you have any questions or concerns, please call the clinic at the number listed above.   Thank you for choosing [facility].        Sincerely,      Slime Gomes PA-C      Resulted Orders   Rapid strep screen   Result Value Ref Range    Specimen Description Throat     Rapid Strep A Screen       NEGATIVE: No Group A streptococcal antigen detected by immunoassay, await culture report.   Beta strep group A culture   Result Value Ref Range    Specimen Description Throat     Culture Micro No beta hemolytic Streptococcus Group A isolated

## 2020-02-16 LAB
BACTERIA SPEC CULT: NORMAL
SPECIMEN SOURCE: NORMAL

## 2020-03-16 ENCOUNTER — MEDICAL CORRESPONDENCE (OUTPATIENT)
Dept: HEALTH INFORMATION MANAGEMENT | Facility: CLINIC | Age: 35
End: 2020-03-16

## 2020-03-17 ENCOUNTER — APPOINTMENT (OUTPATIENT)
Dept: GENERAL RADIOLOGY | Facility: CLINIC | Age: 35
End: 2020-03-17
Attending: EMERGENCY MEDICINE
Payer: COMMERCIAL

## 2020-03-17 ENCOUNTER — HOSPITAL ENCOUNTER (EMERGENCY)
Facility: CLINIC | Age: 35
Discharge: HOME | End: 2020-03-17
Attending: EMERGENCY MEDICINE | Admitting: EMERGENCY MEDICINE
Payer: COMMERCIAL

## 2020-03-17 ENCOUNTER — TRANSCRIBE ORDERS (OUTPATIENT)
Dept: OTHER | Age: 35
End: 2020-03-17

## 2020-03-17 VITALS
HEIGHT: 61 IN | BODY MASS INDEX: 16.99 KG/M2 | HEART RATE: 70 BPM | WEIGHT: 90 LBS | OXYGEN SATURATION: 100 % | TEMPERATURE: 98.5 F | DIASTOLIC BLOOD PRESSURE: 78 MMHG | SYSTOLIC BLOOD PRESSURE: 107 MMHG | RESPIRATION RATE: 20 BRPM

## 2020-03-17 DIAGNOSIS — R07.89 CHEST TIGHTNESS: ICD-10-CM

## 2020-03-17 DIAGNOSIS — J31.2 CHRONIC SORE THROAT: Primary | ICD-10-CM

## 2020-03-17 LAB
DEPRECATED S PYO AG THROAT QL EIA: NEGATIVE
SPECIMEN SOURCE: NORMAL
SPECIMEN SOURCE: NORMAL
STREP GROUP A PCR: NOT DETECTED
TROPONIN I BLD-MCNC: 0 UG/L (ref 0–0.08)

## 2020-03-17 PROCEDURE — 93005 ELECTROCARDIOGRAM TRACING: CPT | Performed by: EMERGENCY MEDICINE

## 2020-03-17 PROCEDURE — 99285 EMERGENCY DEPT VISIT HI MDM: CPT | Mod: 25 | Performed by: EMERGENCY MEDICINE

## 2020-03-17 PROCEDURE — 84484 ASSAY OF TROPONIN QUANT: CPT

## 2020-03-17 PROCEDURE — 71045 X-RAY EXAM CHEST 1 VIEW: CPT

## 2020-03-17 PROCEDURE — 40001204 ZZHCL STATISTIC STREP A RAPID: Performed by: EMERGENCY MEDICINE

## 2020-03-17 PROCEDURE — 93010 ELECTROCARDIOGRAM REPORT: CPT | Mod: Z6 | Performed by: EMERGENCY MEDICINE

## 2020-03-17 PROCEDURE — 99285 EMERGENCY DEPT VISIT HI MDM: CPT | Performed by: EMERGENCY MEDICINE

## 2020-03-17 PROCEDURE — 87651 STREP A DNA AMP PROBE: CPT | Performed by: EMERGENCY MEDICINE

## 2020-03-17 ASSESSMENT — ENCOUNTER SYMPTOMS
CHEST TIGHTNESS: 1
PALPITATIONS: 0
FEVER: 0
GASTROINTESTINAL NEGATIVE: 1
COUGH: 0
SORE THROAT: 1

## 2020-03-17 ASSESSMENT — MIFFLIN-ST. JEOR: SCORE: 1055.62

## 2020-03-17 NOTE — ED AVS SNAPSHOT
[facility], [place], Emergency Department  [address] [place] [id number]  Phone:  [phone]                                    Afia Peirce   MRN: [id number]    Department:  [facility], Emergency Department   Date of Visit:  3/17/2020           After Visit Summary Signature Page    I have received my discharge instructions, and my questions have been answered. I have discussed any challenges I see with this plan with the nurse or doctor.    ..........................................................................................................................................  Patient/Patient Representative Signature      ..........................................................................................................................................  Patient Representative Print Name and Relationship to Patient    ..................................................               ................................................  Date                                   Time    ..........................................................................................................................................  Reviewed by Signature/Title    ...................................................              ..............................................  Date                                               Time          22EPIC Rev 08/18

## 2020-03-18 LAB — INTERPRETATION ECG - MUSE: NORMAL

## 2020-03-18 NOTE — DISCHARGE INSTRUCTIONS
Your chest x-ray, EKG, troponin and strep are all negative or normal.  Your lungs are clear you do not have a fever and your oxygen is 100%.  Follow-up with your primary care provider if you continue to have problems.    TODAY'S VISIT  YOU WERE EVALUATED TODAY AND IT HAS BEEN DECIDED YOU DID NOT NEED TO BE TESTED FOR COVID-19 (NOVEL CORONAVIRUS) AT THIS TIME.   -  The cause of your symptoms is not yet known,  and you did not meet criteria to be tested for COVID-19 at this time.   -  It has been determined that you do not medically need to be hospitalized at this time, and  you can be monitored with self-monitoring, and follow-up with your usual providers as needed (see information below).     SELF-MONITORING INSTRUCTIONS  STAY HOME. If you are feeling ill, we generally recommend people do not go to work, school, or public areas if you are able. Avoid using public transportation, ride-sharing (Uber/Lyft), or taxis. You should only leave your home if you require medical attention (See instructions below, please contact your provider first.)   DO NOT SHARE HOUSEHOLD ITEMS. Do not share dishes, drinking glasses, eating utensils, towels, bedding, etc., with others or pets in your home. These items should be washed with soap and water.   WASH YOUR HANDS OFTEN. Wash your hands with soap and water for at least 20 second, or use hand  regularly. Avoid touching your face with unwashed hands.   SEEK PROMPT MEDICAL ATTENTION IF YOUR ILLNESS IS WORSENING. Watch closely for new or worsening symptoms, such as fever, cough, shortness of breath or difficulty breathing.   PLEASE CONTACT YOUR HEALTHCARE PROVIDER FOR GUIDANCE, OR IF YOU HAVE REMAINING CONCERNS ABOUT THE POSSIBILITY OF COVID-19 INFECTION, TAKE THE FOLLOWING STEPS:  Go to www.[email or web address]. [employer] is our 24/7 online patient care portal. Once there, create an account and start your evaluation. Once you have submitted your information, you will receive care  instructions relevant to your needs. If multiple people in your family need testing, each will have to start a separate case in [facility].  After reviewing the information you submitted about your symptoms and exposure, our care providers may direct you to come to an [facility] clinic location for testing.  SIGN UP FOR [employer]. Sign up for the [facility] application, using the information at the end of this document. Your results and a message about those results can be sent through [employer]. If you do not have [employer], we will call you with your results, but it may take longer.  IF YOU NEED MEDICAL CARE OR HAVE A MEDICAL APPOINTMENT (and it is not an emergency):   -  CALL YOUR HEALTHCARE PROVIDER BEFORE GOING TO THE [facility]  -  TELL THEM THAT YOU ARE BEING TESTING FOR AND MAY HAVE COVID-19.  YOUR CLOSE CONTACTS SHOULD MONITOR THEIR HEALTH. If they develop symptoms, they should visit [email or web address] to determine if testing is needed, and where this is done.   If you have any questions, please contact your usual health care provider or the [facility]) at [phone]    EMERGENCY INSTRUCTIONS  IF YOU NEED EMERGENCY MEDICAL ATTENTION, CALL 911 AND LET THEM KNOW YOU MAY HAVE ARE BEING EVALUATED FOR COVID-19.    WHAT IS COVID-19 (CORONAVIRUS)  COVID-19 is a viral respiratory illness caused by a newly identified coronavirus that was discovered in late 2019 in [place]. Coronaviruses are a large family of viruses. Some coronaviruses cause illnesses in people and others only circulate among animals. Rarely, animal coronaviruses can evolve and infect people. The virus causing COVID-19 may have emerged from an animal source, and it is now able to spread from person to person.     How does it spread?   The virus is thought to spread between people who are in close contact (within about six feet) through respiratory droplets produced when an infected person coughs, sneezes, or talks. It may also  spread when one person touches a surface or object that has the virus on it and then touches their mouth, nose, or eyes. However, this is not thought to be the main way the virus is transmitted.    SYMPTOMS  This coronavirus causes mild to severe respiratory illness with often with fever, cough, and/or difficulty breathing. After exposure, symptoms typically present within 2 to 14 days.     TREATMENTS AND PREVENTION  Patients may also be asked to self-quarantine at home in order to prevent the spread of the coronavirus. There is no antiviral treatment recommended for COVID-19. People with COVID-19 may receive supportive care to help relieve symptoms.    Prevention  No vaccine is currently available for the coronavirus causing COVID-19. The best way to prevent spread of the illness is to avoid exposure through simple precautions. Prevention steps include:   Stay home if you're feeling sick.   Avoid close contact with others, and try to stay at least 6-feet away from others if you're ill.   Wash your hands often with soap and warm water for at least 20 seconds, especially after going to the bathroom; before eating; and after blowing your nose, coughing, or sneezing. Use an alcohol-based hand  with at least 60 percent alcohol if soap and water are not readily available.   Clean and disinfect frequently touched objects and surfaces using a regular household cleaning spray or wipe.     Thank you for your understanding and cooperation.

## 2020-03-18 NOTE — ED PROVIDER NOTES
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 She reports that in the past 3 or 4 hours she developed chest tightness.  She has no history of pulmonary disease, no history of cardiac disease.  She not had cough or fevers.  She says she has been having an ongoing sore throat and plans to see ENT.  She is not a smoker.  She has no risk factors for pulmonary embolism she has no recent travel.  She does not meet criteria for Covid-19 testing.  This part of the medical record was transcribed by Willa Weldon Medical Scribe, from a dictation done by Tommy Reynolds MD.     Past Medical History  Past Medical History:   Diagnosis Date     GERD (gastroesophageal reflux disease)      NO ACTIVE PROBLEMS      Past Surgical History:   Procedure Laterality Date     COLONOSCOPY       HC UGI ENDOSCOPY, SIMPLE EXAM       LAPAROSCOPIC CYSTECTOMY OVARIAN (BENIGN)  11/21/2019    Procedure: Laparoscopic cystectomy ovarian (benign);  Surgeon: Marvin Solomon MD;  Location:  OR     LASER CO2 LAPAROSCOPIC VAPORIZATION ENDOMETRIUM N/A 11/21/2019    Procedure: LAPAROSCOPY, DIAGNOSTIC, WITH VAPORIZATION OF ENDOMETROSIS USING CO2 LASER;  Surgeon: Marvin Solomon MD;  Location:  OR     colestipol (COLESTID) 1 g tablet  MULTIPLE VITAMIN PO  Multiple Vitamins-Minerals (PRESERVISION AREDS 2 PO)  norethindrone-ethinyl estradiol-iron (MICROGESTIN FE1.5/30) 1.5-30 MG-MCG tablet  Omega-3 Fatty Acids (FISH OIL PO)  ondansetron (ZOFRAN) 4 MG tablet  Probiotic Product (PROBIOTIC PO)      Allergies   Allergen Reactions     Sulfa Drugs Other (See Comments)     GI Upset     Diagnostic X-Ray Materials Itching     Mild itching and hives  Mild itching and hives  CT dye     Past medical history, past surgical  "history, medications, and allergies were reviewed with the patient. Additional pertinent items: None    Family History  Family History   Problem Relation Age of Onset     Diabetes Maternal Grandmother      Breast Cancer Maternal Aunt      Ovarian Cancer Maternal Aunt      Uterine Cancer Maternal Aunt      Family history was reviewed with the patient. Additional pertinent items: None    Social History  Social History     Tobacco Use     Smoking status: Never Smoker     Smokeless tobacco: Never Used   Substance Use Topics     Alcohol use: Not Currently     Drug use: Never      Social history was reviewed with the patient. Additional pertinent items: None    Review of Systems   Constitutional: Negative for fever.   HENT: Positive for sore throat.    Respiratory: Positive for chest tightness. Negative for cough.    Cardiovascular: Positive for chest pain. Negative for palpitations and leg swelling.   Gastrointestinal: Negative.    Genitourinary: Negative.    All other systems reviewed and are negative.    A complete review of systems was performed with pertinent positives and negatives noted in the HPI, and all other systems negative.    Physical Exam   BP: (!) 141/95  Pulse: 98  Temp: 98.6  F (37  C)  Resp: 20  Height: 154.9 cm (5' 1\")  Weight: 40.8 kg (90 lb)  SpO2: 99 %  Physical Exam  Vitals signs and nursing note reviewed.   Constitutional:       General: She is not in acute distress.     Appearance: She is well-developed.   HENT:      Head: Normocephalic and atraumatic.      Mouth/Throat:      Mouth: Mucous membranes are moist.      Pharynx: Oropharynx is clear.   Eyes:      General: No scleral icterus.     Conjunctiva/sclera: Conjunctivae normal.      Pupils: Pupils are equal, round, and reactive to light.   Neck:      Musculoskeletal: Neck supple.   Cardiovascular:      Rate and Rhythm: Normal rate and regular rhythm.      Heart sounds: Normal heart sounds.   Pulmonary:      Effort: Pulmonary effort is normal. " No respiratory distress.      Breath sounds: Normal breath sounds. No wheezing.   Skin:     General: Skin is warm and dry.   Neurological:      Mental Status: She is alert and oriented to person, place, and time.      Cranial Nerves: No cranial nerve deficit.   Psychiatric:         Behavior: Behavior normal.         ED Course      Procedures             EKG Interpretation:      Interpreted by Tommy Reynolds MD  Time reviewed: 2000  Symptoms at time of EKG: chest pain   Rhythm: normal sinus   Rate: normal  Axis: normal  Ectopy: none  Conduction: normal  ST Segments/ T Waves: No ST-T wave changes  Q Waves: none  Comparison to prior: No old EKG available    Clinical Impression: normal EKG        XR Chest Port 1 View                Results for orders placed or performed during the hospital encounter of 03/17/20   XR Chest Port 1 View     Status: None (Preliminary result)    Narrative    No acute cardiopulmonary process.   Streptococcus A Rapid Scr w Reflx to PCR     Status: None    Specimen: Throat   Result Value Ref Range    Strep Specimen Description Throat     Streptococcus Group A Rapid Screen Negative NEG^Negative     Medications - No data to display     Assessments & Plan (with Medical Decision Making)   This is a 34-year-old female comes in with a few hours of chest tightness.  Her EKG is normal, chest x-ray is normal, rapid strep was negative. Her lungs are clear, oxygen saturations are normal, she does not meet criteria for testing I advised her to follow the discharge instructions for Covid and without testing.  This part of the medical record was transcribed by Willa Weldon, Medical Scribe, from a dictation done by Tommy Reynolds MD.     I have reviewed the nursing notes. I have reviewed the findings, diagnosis, plan and need for follow up with the patient.    New Prescriptions    No medications on file       Final diagnoses:   Chest tightness     IWilla, am serving as a  trained medical scribe to document services personally performed by Tommy Reynolds MD, based on the provider's statements to me.     I, Tommy Reynolds MD, was physically present and have reviewed and verified the accuracy of this note documented by Willa Weldon.    --  Willa Weldon   Emergency Medicine   [facility], [place], EMERGENCY DEPARTMENT  3/17/2020     Tommy Reynolds MD  03/17/20 2033

## 2020-03-18 NOTE — RESULT ENCOUNTER NOTE
Group A Streptococcus PCR is NEGATIVE   No treatment or change in treatment [facility] ED lab result protocol - Strep protocol.

## 2020-03-28 ENCOUNTER — VIRTUAL VISIT (OUTPATIENT)
Dept: FAMILY MEDICINE | Facility: OTHER | Age: 35
End: 2020-03-28

## 2020-03-28 NOTE — PROGRESS NOTES
"Date: 2020 11:55:21  Clinician: Sultana Morrow  Clinician NPI: [id number]  Patient: Afia Pierce  Patient : 1985  Patient Address: [address] [place] [id number]  Patient Phone: [phone]  Visit Protocol: URI  Patient Summary:  Afia is a 34 year old ( : 1985 ) female who initiated a Visit for cold, sinus infection, or influenza. When asked the question \"Please sign me up to receive news, health information and promotions from [employer].\", Afia responded \"No\".    Afia states her symptoms started gradually 2-3 weeks ago. After her symptoms started, they improved and then got worse again.   Her symptoms consist of ear pain, a headache, myalgia, chills, a sore throat, and nasal congestion. She is experiencing mild difficulty breathing with activities but can speak normally in full sentences. Afia also feels feverish.   Symptom details     Nasal secretions: The color of her mucus is white.    Sore throat: Afia reports having mild throat pain (1-3 on a 10 point pain scale), does not have exudate on her tonsils, and can swallow liquids. The lymph nodes in her neck are not enlarged. A rash has not appeared on the skin since the sore throat started.     Temperature: Her current temperature is 98.2 degrees Fahrenheit.     Headache: She states the headache is moderate (4-6 on a 10 point pain scale).      Afia denies having rhinitis, teeth pain, facial pain or pressure, wheezing, cough, malaise, and enlarged lymph nodes. She also denies having a sinus infection within the past year, taking antibiotic medication for the symptoms, and having recent facial or sinus surgery in the past 60 days.   Precipitating events  Within the past week, Afia has not been exposed to someone with strep throat. She has not recently been exposed to someone with influenza. Afia has been in close contact with the following high risk individuals: people with asthma, heart disease or diabetes, pregnant women, adults 65 " or older, and immunocompromised people.   Pertinent COVID-19 (Coronavirus) information  Afia has not traveled internationally or to the areas where COVID-19 (Coronavirus) is widespread, including cruise ship travel in the last 14 days before the start of her symptoms.   Afia has not had a close contact with a laboratory-confirmed COVID-19 patient within 14 days of symptom onset. She also has not had a close contact with a suspected COVID-19 patient within 14 days of symptom onset.   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 She provides direct patient care. She does not live with a healthcare worker.   Pertinent medical history  Afia does not get yeast infections when she takes antibiotics.   Afia does not need a return to work/school note.   Weight: 90 lbs   Afia does not smoke or use smokeless tobacco.   She denies pregnancy and denies breastfeeding. She has menstruated in the past month.   Weight: 90 lbs    MEDICATIONS: ondansetron oral, colestipol oral, ALLERGIES: Sulfa (Sulfonamide Antibiotics), Iodinated Contrast Media  Clinician Response:  Dear Afia,  Based on the information provided, you have acute bacterial sinusitis, also known as a sinus infection. Sinus infections are caused by bacteria or a virus and symptoms are almost always identical. The difference between the 2 types of infections is timing.  Sinus infections start as viral infections and symptoms improve on their own in about 7 days. If symptoms have not improved after 7 days or have even worsened, a bacterial infection may have developed.  Medication information  I am prescribing:     Amoxicillin-pot clavulanate 875-125 mg oral tablet. Take 1 tablet by mouth every 12 hours for 7 days. There are no refills with this prescription.   Yeast infections can be a common side effect of antibiotics. The most common symptom of a yeast infection is itchiness in and around the vagina. Other signs and  symptoms include burning, redness, or a thick, white vaginal discharge that looks like cottage cheese and does not have a bad smell.  If you become pregnant during this course of treatment, stop taking the medication and contact your primary care provider.  Self care  Steps you can take to be as comfortable as possible:     Rest.    Drink plenty of fluids.    Take a warm shower to loosen congestion    Use a cool-mist humidifier.    Use throat lozenges.    Suck on frozen items such as popsicles.    Drink hot tea with lemon and honey.    Gargle with warm salt water (1/4 teaspoon of salt per 8 ounce glass of water).     When to seek care  Please be seen in a clinic or urgent care if any of the following occur:     Symptoms do not start to improve after 3 days of treatment    New symptoms develop, or symptoms become worse     Please be seen in a clinic or urgent care if new symptoms develop, or symptoms become worse.  It is possible to have an allergic reaction to an antibiotic even if you have not had one in the past. If you notice a new rash, significant swelling, or difficulty breathing, stop taking this medication immediately and go to a clinic or urgent care.  Call 911 or go to the emergency room if you feel that your throat is closing off, you suddenly develop a rash, you are unable to swallow fluids, you are drooling, or you are having difficulty breathing.  COVID-19 (Coronavirus) General Information  With the increase in the number of COVID-19 (Coronavirus) cases, we understand you may have some questions. Below is some helpful information on COVID-19 (Coronavirus).  How can I protect myself and others from the COVID-19 (Coronavirus)?  Because there is currently no vaccine to prevent infection, the best way to protect yourself is to avoid being exposed to this virus. Put distance between yourself and other people if COVID-19 (Coronavirus) is spreading in your community. The virus is thought to spread mainly from  person-to-person.     Between people who are in close contact with one another (within about 6 about) for a prolonged period (10 minutes or longer).    Through respiratory droplets produced when an infected person coughs or sneezes.     The CDC recommends the following additional steps to protect yourself and others:     Wash your hands often with soap and water for at least 20 seconds, especially after blowing your nose, coughing, or sneezing; going to the bathroom; and before eating or preparing food.  Use an alcohol-based hand  that contains at least 60 percent alcohol if soap and water are not available.        Avoid touching your eyes, nose and mouth with unwashed hands.    Avoid close contact with people who are sick.    Stay home when you are sick.    Cover your cough or sneeze with a tissue, then throw the tissue in the trash.    Clean and disinfect frequently touched objects and surfaces.     You can help stop COVID-19 (Coronavirus) by knowing the signs and symptoms:     Fever    Cough    Shortness of breath     Contact your healthcare provider if   Develop symptoms   AND   Have been in close contact with a person known to have COVID-19 (Coronavirus) or live in or have recently traveled from an area with ongoing spread of COVID-19 (Coronavirus). Call ahead before you go to a doctor's office or emergency room. Tell them about your recent travel and your symptoms.   For the most up to date information, visit the CDC's website.  Self-monitoring  Self-monitoring means people should monitor themselves for fever by taking their temperatures twice a day and remain alert for a cough or difficulty breathing.  It is important to check your health two times each day for 14 days after a potential exposure to a person with COVID-19 (Coronavirus) or after travel from a location where COVID-19 (Coronavirus) is widespread. If you have been exposed to a person with COVID-19 (Coronavirus), it may take up to 14 days  to know if you will get sick. Follow the steps below to check and record your health.     Take your temperature with a thermometer twice a day, once in the morning and once in the evening, and watch for a cough or difficulty breathing for 14 days.    Write down your temperature and any COVID-19 symptoms you may have: feeling feverish, coughing, or difficulty breathing.    Stay home from work or school.    Do not take public transportation, taxis, or ride-shares.    Avoid crowded places (such as shopping centers and movie theaters) and limit your activities in public.    Keep your distance from others (about 6 feet or 2 meters).    If you get sick with fever, cough, or trouble breathing, contact your healthcare provider and tell them about your recent travel and/or your symptoms.    If you need to seek medical care for other reasons, such as dialysis, call ahead to your doctor and tell them about your recent travel.     Steps to help prevent the spread of COVID-19 (Coronavirus) if you are sick  If you are sick with COVID-19 (Coronavirus) or suspect you are infected with the virus that causes COVID-19 (Coronavirus), follow the steps below to help prevent the disease from spreading&nbsp;to people in your home and community.     Stay home except to get medical care. Home isolation may be started in consultation with your healthcare clinician.    Separate yourself from other people and animals in your home.    Call ahead before visiting your doctor if you have a medical appointment.    Wear a facemask when you are around other people.    Cover your cough and sneezes.    Clean your hands often.    Avoid sharing personal household items.    Clean and disinfect frequently touched objects and surfaces everyday.    You will need to have someone drop off medications or household supplies (if needed) at your house without coming inside or in contact with you or others living in your house.    Monitor your symptoms and seek  "prompt medical care if your illness is worsening (e.g. Difficulty breathing).    Discontinue home isolation only in consultation with your healthcare provider.     For more detailed and up to date information on what to do if you are sick, visit this link: What to Do If You Are Sick With COVID-19.  Do I need to be tested for COVID-19 (Coronavirus)?     Not everyone needs to be tested for COVID-19 (Coronavirus). Decisions on which patients receive testing will be based on the local spread of COVID-19 (Coronavirus) as well as the symptoms. Your healthcare provider will make the final decision on whether you should be tested.    In the meantime, if you have concerns that you may have been exposed, it is reasonable to practice \"social distancing.\"&nbsp; If you are ill with a cold or flu-like illness, please monitor your symptoms and call your healthcare provider if your symptoms worsen.    For more up to date information, visit this link: COVID-19 (Coronavirus) Frequently Asked Questions and Answers.      Diagnosis: Acute bacterial sinusitis  Diagnosis ICD: J01.90  Prescription: amoxicillin-pot clavulanate 875-125 mg oral tablet 14 tablet, 7 days supply. Take 1 tablet by mouth every 12 hours for 7 days. Refills: 0, Refill as needed: no, Allow substitutions: yes  Pharmacy: [facility] [phone] - [address] [place] [id number]  "

## 2020-04-03 ENCOUNTER — TRANSFERRED RECORDS (OUTPATIENT)
Dept: HEALTH INFORMATION MANAGEMENT | Facility: CLINIC | Age: 35
End: 2020-04-03

## 2020-04-06 NOTE — TELEPHONE ENCOUNTER
FUTURE VISIT INFORMATION      FUTURE VISIT INFORMATION:    Date: 5/29/2020    Time: 8AM     Location: [facility]  REFERRAL INFORMATION:    Referring provider:  Yumiko Cardenas     Referring providers clinic:  [facility]     Reason for visit/diagnosis  Chronic sore throat     RECORDS REQUESTED FROM:       Clinic name Comments Records Status Imaging Status    [facility]  2/15/2020 notes with Slime STALEY [facility] 3/17/2020 ED notes with Dr Reynolds [facility] 3/28/2020 notes with Sultana Morrow NP [facility]  3/16/2020 referral   4/3/2020 OV with Dr Cardenas  Sent to scan 4/6/2020 4/6/2020 9:36AM contacted referrals team, 4 page referral faxed to clinic. SEnt referral to scan, a fax request has been sent to clinic for any records - Amay   *received recs and sent to scan - Amay

## 2020-04-07 ENCOUNTER — TRANSFERRED RECORDS (OUTPATIENT)
Dept: HEALTH INFORMATION MANAGEMENT | Facility: CLINIC | Age: 35
End: 2020-04-07

## 2020-05-29 ENCOUNTER — PRE VISIT (OUTPATIENT)
Dept: OTOLARYNGOLOGY | Facility: CLINIC | Age: 35
End: 2020-05-29

## 2020-07-13 ENCOUNTER — TELEPHONE (OUTPATIENT)
Dept: OTOLARYNGOLOGY | Facility: CLINIC | Age: 35
End: 2020-07-13

## 2020-07-13 NOTE — TELEPHONE ENCOUNTER
LVM to reschedule to video visit,      If pt would like to do telephone visit for 7/17 appt please confirm best number for contact. If pt would like video visit please confirm e-mail and sign them up for mychart as we will sent set up instructions though that.       If patient wants an in person visit, schedule on a Thursday only for [place]

## 2020-07-16 NOTE — TELEPHONE ENCOUNTER
LVM to reschedule to video visit, Keep WIN      If pt would like to do telephone visit for 7/17 appt please confirm best number for contact. If pt would like video visit please confirm e-mail and what device they will be using.     If we do not hear back by end of day we will be converting to telephone

## 2021-01-07 PROBLEM — M72.2 BILATERAL PLANTAR FASCIITIS: Status: RESOLVED | Noted: 2019-10-03 | Resolved: 2021-01-07

## 2021-01-07 PROBLEM — M76.829 POSTERIOR TIBIAL TENDON DYSFUNCTION: Status: RESOLVED | Noted: 2019-10-03 | Resolved: 2021-01-07

## 2021-01-07 NOTE — PROGRESS NOTES
Discharge Note    Progress reporting period is from initial evaluation date (please see noted date below) to Oct 17, 2019.  Linked Episodes   Type: Episode: Status: Noted: Resolved: Last update: Updated by:   PHYSICAL THERAPY plantar fasciitis 10/2/2019 Active 10/2/2019  10/17/2019  8:25 AM Caitlin Lopez PT      Comments:       Afia failed to follow up and current status is unknown.  Please see information below for last relevant information on current status.  Patient seen for 3 visits.    SUBJECTIVE  Subjective changes noted by patient:  some days I have no pain.   .  Current pain level is 5/10.     Previous pain level was  7/10.   Changes in function:  Yes (See Goal flowsheet attached for changes in current functional level)  Adverse reaction to treatment or activity: None    OBJECTIVE  Changes noted in objective findings: increase in walking with heel toe pattern increase DF in stance. Balance improving slowly     ASSESSMENT/PLAN  Diagnosis: bilateral plantar fasciitis, PTTD,  left lateral ankle instability    Updated problem list and treatment plan:   Pain - HEP  Decreased ROM/flexibility - HEP  Impaired muscle performance - HEP  Impaired gait - HEP  Impaired balance - HEP  STG/LTGs have been met or progress has been made towards goals:  Yes, please see goal flowsheet for most current information  Assessment of Progress: current status is unknown.    Last current status: Pt is progressing slower than anticipated   Self Management Plans:  HEP  I have re-evaluated this patient and find that the nature, scope, duration and intensity of the therapy is appropriate for the medical condition of the patient.  Afia continues to require the following intervention to meet STG and LTG's:  HEP.    Recommendations:  Discharge with current home program.  Patient to follow up with MD as needed.    Please refer to the daily flowsheet for treatment today, total treatment time and time spent performing 1:1 timed  codes.

## 2022-01-09 ENCOUNTER — NURSE TRIAGE (OUTPATIENT)
Dept: NURSING | Facility: CLINIC | Age: 37
End: 2022-01-09
Payer: COMMERCIAL

## 2022-01-09 NOTE — TELEPHONE ENCOUNTER
Afia states that she has covid and has bad congestion in chest and using mucinex and robitussin and humidifier and is not getting better.  Symptoms started on Thursday Jan 6th with a fever of 100.1.  Cough is present.  Denies shortness of breath.  Patient will continue to treat symptoms.    COVID 19 Nurse Triage Plan/Patient Instructions    Please be aware that novel coronavirus (COVID-19) may be circulating in the community. If you develop symptoms such as fever, cough, or SOB or if you have concerns about the presence of another infection including coronavirus (COVID-19), please contact your health care provider or visit [email or web address].     Disposition/Instructions    Home care recommended. Follow home care protocol based instructions.    Thank you for taking steps to prevent the spread of this virus.  o Limit your contact with others.  o Wear a simple mask to cover your cough.  o Wash your hands well and often.    Resources    [facility]: About COVID-19: [email or web address]    CDC: What to Do If You're Sick: www.cdc.gov/coronavirus/2019-ncov/about/steps-when-sick.html    CDC: Ending Home Isolation: www.cdc.gov/coronavirus/2019-ncov/hcp/disposition-in-home-patients.html     CDC: Caring for Someone: www.cdc.gov/coronavirus/2019-ncov/if-you-are-sick/care-for-someone.html     [facility]: Interim Guidance for Hospital Discharge to Home: www.health.[facility].[place].us/diseases/coronavirus/hcp/hospdischarge.pdf    [facility] clinical trials (COVID-19 research studies): clinicalaffairs.[facility]/n-clinical-trials     Below are the COVID-19 hotlines at the [facility] of [facility] ([facility]). Interpreters are available.   o For health questions: Call 687-110-8920 or 1-390.762.7504 (7 a.m. to 7 p.m.)  o For questions about schools and childcare: Call 924-162-4011 or 1-114.866.1967 (7 a.m. to 7 p.m.)                       Reason for Disposition    [1] COVID-19 diagnosed by positive lab test  AND [2] mild symptoms (e.g., cough, fever, others) AND [3] no complications or SOB    Additional Information    Negative: SEVERE difficulty breathing (e.g., struggling for each breath, speaks in single words)    Negative: Difficult to awaken or acting confused (e.g., disoriented, slurred speech)    Negative: Bluish (or gray) lips or face now    Negative: Shock suspected (e.g., cold/pale/clammy skin, too weak to stand, low BP, rapid pulse)    Negative: Sounds like a life-threatening emergency to the triager    Negative: SEVERE or constant chest pain or pressure (Exception: mild central chest pain, present only when coughing)    Negative: MODERATE difficulty breathing (e.g., speaks in phrases, SOB even at rest, pulse 100-120)    Negative: [1] Headache AND [2] stiff neck (can't touch chin to chest)    Negative: MILD difficulty breathing (e.g., minimal/no SOB at rest, SOB with walking, pulse <100)    Negative: Chest pain or pressure    Negative: Patient sounds very sick or weak to the triager    Negative: Fever > 103 F (39.4 C)    Negative: [1] Fever > 101 F (38.3 C) AND [2] age > 60 years    Negative: [1] Fever > 100.0 F (37.8 C) AND [2] bedridden (e.g., nursing home patient, CVA, chronic illness, recovering from surgery)    Negative: HIGH RISK for severe COVID complications (e.g., age > 64 years, obesity with BMI > 25, pregnant, chronic lung disease or other chronic medical condition)  (Exception: Already seen by PCP and no new or worsening symptoms.)    Negative: [1] HIGH RISK patient AND [2] influenza is widespread in the community AND [3] ONE OR MORE respiratory symptoms: cough, sore throat, runny or stuffy nose    Negative: [1] HIGH RISK patient AND [2] influenza exposure within the last 7 days AND [3] ONE OR MORE respiratory symptoms: cough, sore throat, runny or stuffy nose    Negative: [1] COVID-19 infection suspected by caller or triager AND [2] mild symptoms (cough, fever, or others) AND [3] negative COVID-19  rapid test    Negative: Fever present > 3 days (72 hours)    Negative: [1] Fever returns after gone for over 24 hours AND [2] symptoms worse or not improved    Negative: [1] Continuous (nonstop) coughing interferes with work or school AND [2] no improvement using cough treatment per protocol    Negative: Cough present > 3 weeks    Negative: [1] COVID-19 diagnosed by positive lab test AND [2] NO symptoms (e.g., cough, fever, others)    Protocols used: CORONAVIRUS (COVID-19) DIAGNOSED OR [id number] 8.25.2021

## 2022-01-30 ENCOUNTER — HEALTH MAINTENANCE LETTER (OUTPATIENT)
Age: 37
End: 2022-01-30

## 2022-09-14 NOTE — PROGRESS NOTES
SUBJECTIVE:  Chief Complaint   Patient presents with     UTI     Had laproscopy 2 weeks ago. Pelvic pain and pain with urination         Afia Pierce is a 34 year old female who  presents today for a possible UTI. Symptoms of dysuria, frequency, burning and suprapubic pain and pressure have been going on for 1day(s).  Hematuria no.  gradual onset, still present and constantand moderate.  There is no history of fever, chills, nausea or vomiting.  No history of vaginal   discharge. This patient does not have a history of urinary tract infections. Patient denies long duration, rigors, flank pain, temperature > 101 degrees F. and Vomiting, significant nausea or diarrhea or vaginal discharge, vaginal odor and vaginal itching   She is receiving evaluation and treatment for endometriosis, had laparoscopy 2 weeks ago with   Confirmed endometriosis and treatment with destruction of the areas of endometriosis    Past Medical History:   Diagnosis Date     GERD (gastroesophageal reflux disease)      NO ACTIVE PROBLEMS      Patient Active Problem List   Diagnosis     Posterior tibial tendon dysfunction     Bilateral plantar fasciitis     Pelvic pain       ALLERGIES:  Sulfa drugs and Diagnostic x-ray materials    MEDs  colestipol (COLESTID) 1 g tablet, Take 1 g by mouth  MULTIPLE VITAMIN PO, Take 2 tablets by mouth daily   Omega-3 Fatty Acids (FISH OIL PO), Take 2 tablets by mouth daily   ondansetron (ZOFRAN) 4 MG tablet, Take by mouth every 8 hours as needed for nausea  Probiotic Product (PROBIOTIC PO), Take 1 tablet by mouth daily  HYDROcodone-acetaminophen (NORCO) 5-325 MG tablet, Take 1-2 tablets by mouth every 4 hours as needed for moderate to severe pain (Patient not taking: Reported on 12/7/2019)    No current facility-administered medications on file prior to visit.       Social History     Tobacco Use     Smoking status: Never Smoker     Smokeless tobacco: Never Used   Substance Use Topics     Alcohol use: Not Currently        Family History   Problem Relation Age of Onset     Diabetes Maternal Grandmother      Breast Cancer Maternal Aunt      Ovarian Cancer Maternal Aunt      Uterine Cancer Maternal Aunt        ROS:   CONSTITUTIONAL:NEGATIVE for fever, chills,    INTEGUMENTARY/SKIN: NEGATIVE for worrisome rashes,   or lesions  EYES: NEGATIVE for vision changes or irritation  ENT/MOUTH: NEGATIVE for ear, mouth and throat problems  RESP:NEGATIVE for significant cough or SOB    OBJECTIVE:  /73   Pulse 86   Temp 98.2  F (36.8  C) (Oral)   Wt 41.7 kg (92 lb)   LMP 11/13/2019   SpO2 100%   BMI 17.38 kg/m    GENERAL APPEARANCE: healthy, alert and no distress  RESP: lungs clear to auscultation - no rales, rhonchi or wheezes  CV: regular rates and rhythm, normal S1 S2, no murmur noted  ABDOMEN:  soft, nontender, no HSM or masses and bowel sounds normal  BACK: No CVA tenderness  SKIN: no suspicious lesions or rashes    Results for orders placed or performed in visit on 12/07/19   UA reflex to Microscopic and Culture     Status: Abnormal   Result Value Ref Range    Color Urine Yellow     Appearance Urine Clear     Glucose Urine Negative NEG^Negative mg/dL    Bilirubin Urine Negative NEG^Negative    Ketones Urine Negative NEG^Negative mg/dL    Specific Gravity Urine <=1.005 1.003 - 1.035    Blood Urine Small (A) NEG^Negative    pH Urine 7.0 5.0 - 7.0 pH    Protein Albumin Urine Negative NEG^Negative mg/dL    Urobilinogen Urine 0.2 0.2 - 1.0 EU/dL    Nitrite Urine Negative NEG^Negative    Leukocyte Esterase Urine Negative NEG^Negative    Source Midstream Urine    Urine Microscopic     Status: None   Result Value Ref Range    WBC Urine 0 - 5 OTO5^0 - 5 /HPF    RBC Urine O - 2 OTO2^O - 2 /HPF    Squamous Epithelial /LPF Urine Few FEW^Few /LPF   Wet prep     Status: Abnormal   Result Value Ref Range    Specimen Description Vagina     Wet Prep No Trichomonas seen     Wet Prep Few  Clue cells seen   (A)     Wet Prep No yeast seen     Wet  Prep Moderate  WBC'S seen            ASSESSMENT:   Dysuria     - Wet prep  - UA reflex to Microscopic and Culture  - Urine Microscopic       We discussed that the urine sample had no/  Minimal signs of pus, blood , bacteria that are usually found in a urinary tract infection.   Other possible causes of dysuria were discussed including irritation from concentrated urine or crystals in the urine,    Vaginal infection,  Or referred pain from her endometriosis         Bacterial vaginosis     - metroNIDAZOLE (FLAGYL) 500 MG tablet; Take 1 tablet (500 mg) by mouth 2 times daily for 10 days    Discussed no alcohol with metronidazole    If persistent pelvic pain symptoms, follow-up with OB/ GYN for ongoing management     MED

## 2022-09-18 ENCOUNTER — HEALTH MAINTENANCE LETTER (OUTPATIENT)
Age: 37
End: 2022-09-18

## 2023-05-07 ENCOUNTER — HEALTH MAINTENANCE LETTER (OUTPATIENT)
Age: 38
End: 2023-05-07

## 2023-07-10 ENCOUNTER — MEDICAL CORRESPONDENCE (OUTPATIENT)
Dept: HEALTH INFORMATION MANAGEMENT | Facility: CLINIC | Age: 38
End: 2023-07-10

## 2023-07-10 ENCOUNTER — TRANSFERRED RECORDS (OUTPATIENT)
Dept: HEALTH INFORMATION MANAGEMENT | Facility: CLINIC | Age: 38
End: 2023-07-10

## 2024-01-13 ENCOUNTER — TRANSFERRED RECORDS (OUTPATIENT)
Dept: MULTI SPECIALTY CLINIC | Facility: CLINIC | Age: 39
End: 2024-01-13

## 2024-01-13 LAB — PAP SMEAR - HIM PATIENT REPORTED: NORMAL

## 2024-01-19 ENCOUNTER — MEDICAL CORRESPONDENCE (OUTPATIENT)
Dept: SCHEDULING | Facility: CLINIC | Age: 39
End: 2024-01-19
Payer: COMMERCIAL

## 2024-03-14 ENCOUNTER — TRANSFERRED RECORDS (OUTPATIENT)
Dept: HEALTH INFORMATION MANAGEMENT | Facility: CLINIC | Age: 39
End: 2024-03-14

## 2024-03-14 ENCOUNTER — HOSPITAL ENCOUNTER (OUTPATIENT)
Dept: NUCLEAR MEDICINE | Facility: CLINIC | Age: 39
Setting detail: NUCLEAR MEDICINE
Discharge: HOME | End: 2024-03-14
Attending: INTERNAL MEDICINE | Admitting: INTERNAL MEDICINE
Payer: COMMERCIAL

## 2024-03-14 DIAGNOSIS — K21.9 GERD (GASTROESOPHAGEAL REFLUX DISEASE): ICD-10-CM

## 2024-03-14 DIAGNOSIS — R11.2 NAUSEA AND/OR VOMITING: ICD-10-CM

## 2024-03-14 DIAGNOSIS — R10.9 ABDOMINAL PAIN: ICD-10-CM

## 2024-03-14 PROCEDURE — 78264 GASTRIC EMPTYING IMG STUDY: CPT

## 2024-03-14 PROCEDURE — A9541 TC99M SULFUR COLLOID: HCPCS | Performed by: INTERNAL MEDICINE

## 2024-03-14 PROCEDURE — 343N000001 HC RX 343: Performed by: INTERNAL MEDICINE

## 2024-03-14 RX ADMIN — Medication 1 MILLICURIE: at 07:55

## 2024-03-15 ENCOUNTER — MEDICAL CORRESPONDENCE (OUTPATIENT)
Dept: HEALTH INFORMATION MANAGEMENT | Facility: CLINIC | Age: 39
End: 2024-03-15
Payer: COMMERCIAL

## 2024-03-19 ENCOUNTER — ANCILLARY PROCEDURE (OUTPATIENT)
Dept: ULTRASOUND IMAGING | Facility: CLINIC | Age: 39
End: 2024-03-19
Attending: FAMILY MEDICINE
Payer: COMMERCIAL

## 2024-03-19 ENCOUNTER — ANCILLARY PROCEDURE (OUTPATIENT)
Dept: MRI IMAGING | Facility: CLINIC | Age: 39
End: 2024-03-19
Attending: FAMILY MEDICINE
Payer: COMMERCIAL

## 2024-03-19 DIAGNOSIS — N80.9 ENDOMETRIOSIS: ICD-10-CM

## 2024-03-19 DIAGNOSIS — M54.9 BACK PAIN: ICD-10-CM

## 2024-03-19 PROCEDURE — 72148 MRI LUMBAR SPINE W/O DYE: CPT | Mod: TC | Performed by: RADIOLOGY

## 2024-03-19 PROCEDURE — 76856 US EXAM PELVIC COMPLETE: CPT | Mod: TC | Performed by: RADIOLOGY

## 2024-03-19 PROCEDURE — 76830 TRANSVAGINAL US NON-OB: CPT | Mod: TC | Performed by: RADIOLOGY

## 2024-06-21 ENCOUNTER — ANCILLARY PROCEDURE (OUTPATIENT)
Dept: MAMMOGRAPHY | Facility: CLINIC | Age: 39
End: 2024-06-21
Attending: FAMILY MEDICINE
Payer: COMMERCIAL

## 2024-06-21 ENCOUNTER — ANCILLARY PROCEDURE (OUTPATIENT)
Dept: ULTRASOUND IMAGING | Facility: CLINIC | Age: 39
End: 2024-06-21
Attending: FAMILY MEDICINE
Payer: COMMERCIAL

## 2024-06-21 DIAGNOSIS — N60.19 FIBROCYSTIC BREAST CHANGES, UNSPECIFIED LATERALITY: ICD-10-CM

## 2024-06-21 PROCEDURE — G0279 TOMOSYNTHESIS, MAMMO: HCPCS | Performed by: STUDENT IN AN ORGANIZED HEALTH CARE EDUCATION/TRAINING PROGRAM

## 2024-06-21 PROCEDURE — 76642 ULTRASOUND BREAST LIMITED: CPT | Mod: 50 | Performed by: STUDENT IN AN ORGANIZED HEALTH CARE EDUCATION/TRAINING PROGRAM

## 2024-06-21 PROCEDURE — 77066 DX MAMMO INCL CAD BI: CPT | Performed by: STUDENT IN AN ORGANIZED HEALTH CARE EDUCATION/TRAINING PROGRAM

## 2024-06-24 ENCOUNTER — OFFICE VISIT (OUTPATIENT)
Dept: FAMILY MEDICINE | Facility: CLINIC | Age: 39
End: 2024-06-24
Payer: COMMERCIAL

## 2024-06-24 VITALS
BODY MASS INDEX: 17.66 KG/M2 | OXYGEN SATURATION: 99 % | WEIGHT: 96 LBS | RESPIRATION RATE: 16 BRPM | HEART RATE: 86 BPM | TEMPERATURE: 98.6 F | SYSTOLIC BLOOD PRESSURE: 102 MMHG | HEIGHT: 62 IN | DIASTOLIC BLOOD PRESSURE: 76 MMHG
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
## 2024-06-24 DIAGNOSIS — R53.83 FATIGUE, UNSPECIFIED TYPE: Primary | ICD-10-CM

## 2024-06-24 LAB
ALBUMIN SERPL BCG-MCNC: 4.6 G/DL (ref 3.5–5.2)
ALP SERPL-CCNC: 82 U/L (ref 40–150)
ALT SERPL W P-5'-P-CCNC: 12 U/L (ref 0–50)
ANION GAP SERPL CALCULATED.3IONS-SCNC: 12 MMOL/L (ref 7–15)
AST SERPL W P-5'-P-CCNC: 21 U/L (ref 0–45)
BASOPHILS # BLD AUTO: 0 10E3/UL (ref 0–0.2)
BASOPHILS NFR BLD AUTO: 1 %
BILIRUB SERPL-MCNC: 0.5 MG/DL
BUN SERPL-MCNC: 18.2 MG/DL (ref 6–20)
CALCIUM SERPL-MCNC: 9.6 MG/DL (ref 8.6–10)
CHLORIDE SERPL-SCNC: 102 MMOL/L (ref 98–107)
CREAT SERPL-MCNC: 0.72 MG/DL (ref 0.51–0.95)
DEPRECATED HCO3 PLAS-SCNC: 21 MMOL/L (ref 22–29)
EGFRCR SERPLBLD CKD-EPI 2021: >90 ML/MIN/1.73M2
EOSINOPHIL # BLD AUTO: 0 10E3/UL (ref 0–0.7)
EOSINOPHIL NFR BLD AUTO: 0 %
ERYTHROCYTE [DISTWIDTH] IN BLOOD BY AUTOMATED COUNT: 11.6 % (ref 10–15)
GLUCOSE SERPL-MCNC: 90 MG/DL (ref 70–99)
HCT VFR BLD AUTO: 42 % (ref 35–47)
HGB BLD-MCNC: 13.8 G/DL (ref 11.7–15.7)
IMM GRANULOCYTES # BLD: 0 10E3/UL
IMM GRANULOCYTES NFR BLD: 0 %
LYMPHOCYTES # BLD AUTO: 1.5 10E3/UL (ref 0.8–5.3)
LYMPHOCYTES NFR BLD AUTO: 25 %
MCH RBC QN AUTO: 29.4 PG (ref 26.5–33)
MCHC RBC AUTO-ENTMCNC: 32.9 G/DL (ref 31.5–36.5)
MCV RBC AUTO: 89 FL (ref 78–100)
MONOCYTES # BLD AUTO: 0.5 10E3/UL (ref 0–1.3)
MONOCYTES NFR BLD AUTO: 8 %
NEUTROPHILS # BLD AUTO: 3.9 10E3/UL (ref 1.6–8.3)
NEUTROPHILS NFR BLD AUTO: 66 %
PLATELET # BLD AUTO: 225 10E3/UL (ref 150–450)
POTASSIUM SERPL-SCNC: 4.1 MMOL/L (ref 3.4–5.3)
PROT SERPL-MCNC: 8.3 G/DL (ref 6.4–8.3)
RBC # BLD AUTO: 4.7 10E6/UL (ref 3.8–5.2)
SODIUM SERPL-SCNC: 135 MMOL/L (ref 135–145)
TSH SERPL DL<=0.005 MIU/L-ACNC: 1.29 UIU/ML (ref 0.3–4.2)
WBC # BLD AUTO: 5.9 10E3/UL (ref 4–11)

## 2024-06-24 PROCEDURE — 99204 OFFICE O/P NEW MOD 45 MIN: CPT | Performed by: FAMILY MEDICINE

## 2024-06-24 PROCEDURE — 84443 ASSAY THYROID STIM HORMONE: CPT | Performed by: FAMILY MEDICINE

## 2024-06-24 PROCEDURE — 86618 LYME DISEASE ANTIBODY: CPT | Performed by: FAMILY MEDICINE

## 2024-06-24 PROCEDURE — 80053 COMPREHEN METABOLIC PANEL: CPT | Performed by: FAMILY MEDICINE

## 2024-06-24 PROCEDURE — 85025 COMPLETE CBC W/AUTO DIFF WBC: CPT | Performed by: FAMILY MEDICINE

## 2024-06-24 PROCEDURE — 36415 COLL VENOUS BLD VENIPUNCTURE: CPT | Performed by: FAMILY MEDICINE

## 2024-06-24 ASSESSMENT — ENCOUNTER SYMPTOMS: FATIGUE: 1

## 2024-06-24 ASSESSMENT — PAIN SCALES - GENERAL: PAINLEVEL: NO PAIN (0)

## 2024-06-24 NOTE — PROGRESS NOTES
Assessment & Plan     Fatigue, unspecified type  Vague, non-severe symptom reported by patient.  She is concerned about lyme disease due to having been bitten by ticks multiple times in the last several weeks.  Exam today did not show evidence of EM or severe illness.  Other differentials of fatigue discussed with patient: anemia, thyroid deficiency, metabolic disease.  Patient agreed to testing as below.  Advised oral hydration and healthy eating habits.  Return precautions discussed and given to patient.   - CBC with Platelets & Differential  - Comprehensive metabolic panel  - TSH with free T4 reflex  - Lyme Disease Total Abs Bld with Reflex to Confirm CLIA    Tick bite, unspecified site, initial encounter  No evidence of EM. No currently visible hemorrhagic papules or bite sites.  See above for plan.              Patient Instructions   You will be contacted in 1-5 business days for results of your lab tests.   Further recommendations thereafter.    For now, drink plenty of water a day, and eat healthy and nutritious meals.    Naomi Oreilly is a 39 year old, presenting for the following health issues:  LAB REQUEST (Lymes concerns, Pt states she is getting tick bites semi- regularly. ) and Fatigue (Feeling feverish one week ago, no head aches currently, no body aches currently)        6/24/2024    11:19 AM   Additional Questions   Roomed by Chitra HERRON MA   Accompanied by self         6/24/2024    11:19 AM   Patient Reported Additional Medications   Patient reports taking the following new medications none     Via the Health Maintenance questionnaire, the patient has reported the following services have been completed -Cervical Cancer Screening: [facility] 2024-01-13, this information has been sent to the abstraction team.  Fatigue  Associated symptoms include fatigue.   History of Present Illness       Reason for visit:  Lyme disease  Symptom onset:  1-2 weeks ago  Symptoms include:  Fatigue  Symptom  "intensity:  Mild  Symptom progression:  Staying the same  Had these symptoms before:  No  What makes it worse:  Heat  What makes it better:  Relaxation    She eats 0-1 servings of fruits and vegetables daily.She consumes 1 sweetened beverage(s) daily.She exercises with enough effort to increase her heart rate 9 or less minutes per day.  She exercises with enough effort to increase her heart rate 3 or less days per week. She is missing 5 dose(s) of medications per week.         Concern - fatigue  Onset: about 1-2 weeks ago  Description: feeling generally tired, feverish sometimes, sometimes chills.  Intensity: mild  Progression of Symptoms:  intermittent  Accompanying Signs & Symptoms: denies rash, chest pain, dyspnea, or abd pain  Previous history of similar problem: none; patient reports has had tick bites in the last month or so  Precipitating factors:        Worsened by: none  Alleviating factors:        Improved by: none  Therapies tried and outcome: excedrin - no improvement.      Patient said she has hx of endometriosis.  She reports hx of irregular vaginal bleeding and anemia.    Review of Systems  Constitutional, HEENT, cardiovascular, pulmonary, GI, , musculoskeletal, neuro, skin, endocrine and psych systems are negative, except as otherwise noted.      Objective    /76 (BP Location: Right arm, Patient Position: Sitting, Cuff Size: Adult Regular)   Pulse 86   Temp 98.6  F (37  C) (Tympanic)   Resp 16   Ht 1.568 m (5' 1.75\")   Wt 43.5 kg (96 lb)   LMP 06/12/2024 (Within Days)   SpO2 99%   BMI 17.70 kg/m    Body mass index is 17.7 kg/m .  Physical Exam   GENERAL: underweight,  alert and no distress, ambulatory w/o assist  NECK: no tenderness, no adenopathy,  Thyroid not enlarged  RESP: lungs clear to auscultation - no rales, no rhonchi, no wheezes  CV: regular rates and rhythm, no murmur  MS: no edema; full range of motion x 4 grossly  SKIN: no suspicious lesions, no visible target rash or " erythema migrans      Results for orders placed or performed in visit on 06/24/24   CBC with Platelets & Differential     Status: None (In process)    Narrative    The following orders were created for panel order CBC with Platelets & Differential.  Procedure                               Abnormality         Status                     ---------                               -----------         ------                     CBC with platelets and d...[[id number]]                      In process                   Please view results for these tests on the individual orders.           Signed Electronically by: Smooth Nash MD

## 2024-06-24 NOTE — PATIENT INSTRUCTIONS
You will be contacted in 1-5 business days for results of your lab tests.   Further recommendations thereafter.    For now, drink plenty of water a day, and eat healthy and nutritious meals.

## 2024-06-26 LAB — B BURGDOR IGG+IGM SER QL: 0.25

## 2024-07-14 ENCOUNTER — HEALTH MAINTENANCE LETTER (OUTPATIENT)
Age: 39
End: 2024-07-14

## 2024-09-17 ENCOUNTER — VIRTUAL VISIT (OUTPATIENT)
Dept: FAMILY MEDICINE | Facility: CLINIC | Age: 39
End: 2024-09-17
Payer: COMMERCIAL

## 2024-09-17 DIAGNOSIS — R19.7 DIARRHEA, UNSPECIFIED TYPE: ICD-10-CM

## 2024-09-17 DIAGNOSIS — R10.9 INTERMITTENT ABDOMINAL PAIN: Primary | ICD-10-CM

## 2024-09-17 PROCEDURE — 99441 PR PHYSICIAN TELEPHONE EVALUATION 5-10 MIN: CPT | Performed by: PHYSICIAN ASSISTANT

## 2024-09-17 ASSESSMENT — ENCOUNTER SYMPTOMS
DIZZINESS: 0
ABDOMINAL PAIN: 1
DIAPHORESIS: 0
LIGHT-HEADEDNESS: 0
FATIGUE: 0
WEAKNESS: 0
FEVER: 1
VOMITING: 0
HEMATURIA: 0
UNEXPECTED WEIGHT CHANGE: 0
DIARRHEA: 1
NAUSEA: 1
DYSURIA: 0
CHILLS: 0

## 2024-09-17 NOTE — PROGRESS NOTES
Afia is a 39 year old who is being evaluated via a billable telephone visit.    What phone number would you like to be contacted at? [phone]  How would you like to obtain your AVS? Keithharmeli  Originating Location (pt. Location): Home    Distant Location (provider location):  On-site    Assessment & Plan     Diarrhea, unspecified type  Intermittent abdominal pain  Patient is a 39-year-old female who presents to telephone visit due to intermittent lower abdominal pain described as a rapid twinge that occurs multiple times per day as well as diarrhea starting around 1 week ago and improving.  Patient concern for H. pylori as coworker has this with similar symptoms.  Long-term history of reflux without medication management.  Discussed that abdominal pain would be better evaluated in clinic in person.  Diarrhea may be self-limiting as it has improved significantly since starting 1 week ago.  Differential is quite broad given symptoms.  Discussed testing options as well as possible treatment such as famotidine for GERD.  Patient wishes to proceed with testing as listed below.  She is agreeable to in person evaluation for any new or worsening symptoms.  Urgent follow-up precautions provided.  - Comprehensive metabolic panel (BMP + Alb, Alk Phos, ALT, AST, Total. Bili, TP); Future  - CBC with platelets; Future  - ESR: Erythrocyte sedimentation rate; Future  - CRP, inflammation; Future  - Helicobacter pylori Antigen Stool; Future      See Patient Instructions    Subjective   Afia is a 39 year old, presenting for the following health issues:  Stool Sample    Patient stated that she was experiencing same symptoms as her co-worker when her co-worker tested positive for H.Pylori, she would like to check just to make sure she is okay.         9/17/2024    10:51 AM   Additional Questions   Roomed by Eri SHAY MA   Accompanied by No one         9/17/2024    10:51 AM   Patient Reported Additional Medications   Patient reports  taking the following new medications None     HPI     Patient notes diarrhea X 1 week that is improving and was initially 5X per day. Today it has only occurred 1X today and is malodorous. She didn't think much of it because this happens to her intermittently. She also notes intermittent twinges in stomach. Pain is located to the lower abdomen and is bilateral. Symptoms occur around 5 times per day and are sudden and then disappear, but today it seems to occur more often. Pain is not increasing. Nothing specific changes pain or triggers it. Patient us unsure if stool is black. She does note green. She has acid reflux, does not take medication for this, and it occurs daily.     Review of Systems   Constitutional:  Positive for fever. Negative for chills, diaphoresis, fatigue and unexpected weight change.   Gastrointestinal:  Positive for abdominal pain, diarrhea and nausea. Negative for vomiting.   Genitourinary:  Negative for dysuria, hematuria and vaginal discharge.   Neurological:  Negative for dizziness, syncope, weakness and light-headedness.           Objective           Vitals:  No vitals were obtained today due to virtual visit.    Physical Exam   General: Alert and no distress //Respiratory: No audible wheeze, cough, or shortness of breath // Psychiatric:  Appropriate affect, tone, and pace of words          Phone call duration: 8 minutes  Signed Electronically by: Helena Hanks PA-C

## 2024-09-17 NOTE — PATIENT INSTRUCTIONS
For further evaluation of your diarrhea and abdominal pain we are completing lab work as discussed.  All lab results will be sent to [facility].  Make sure to call your clinic to schedule the lab appointment.    As discussed, for any new or worsening symptoms, please be evaluated in person at clinic, urgent care, or the emergency department based on severity.

## 2025-02-17 ENCOUNTER — LAB REQUISITION (OUTPATIENT)
Dept: LAB | Facility: CLINIC | Age: 40
End: 2025-02-17
Payer: COMMERCIAL

## 2025-02-17 DIAGNOSIS — Z32.01 ENCOUNTER FOR PREGNANCY TEST, RESULT POSITIVE: ICD-10-CM

## 2025-02-17 LAB — HCG INTACT+B SERPL-ACNC: ABNORMAL MIU/ML

## 2025-02-17 PROCEDURE — 84702 CHORIONIC GONADOTROPIN TEST: CPT | Mod: ORL | Performed by: ADVANCED PRACTICE MIDWIFE

## 2025-02-19 ENCOUNTER — LAB REQUISITION (OUTPATIENT)
Dept: LAB | Facility: CLINIC | Age: 40
End: 2025-02-19
Payer: COMMERCIAL

## 2025-02-19 DIAGNOSIS — Z32.01 ENCOUNTER FOR PREGNANCY TEST, RESULT POSITIVE: ICD-10-CM

## 2025-02-19 LAB — HCG INTACT+B SERPL-ACNC: ABNORMAL MIU/ML

## 2025-02-19 PROCEDURE — 84702 CHORIONIC GONADOTROPIN TEST: CPT | Mod: ORL | Performed by: ADVANCED PRACTICE MIDWIFE

## 2025-06-06 ENCOUNTER — ANCILLARY PROCEDURE (OUTPATIENT)
Dept: ULTRASOUND IMAGING | Facility: CLINIC | Age: 40
End: 2025-06-06
Attending: INTERNAL MEDICINE
Payer: COMMERCIAL

## 2025-06-06 DIAGNOSIS — R10.11 RIGHT UPPER QUADRANT PAIN: ICD-10-CM

## 2025-06-06 DIAGNOSIS — K21.9 GASTROESOPHAGEAL REFLUX DISEASE, UNSPECIFIED WHETHER ESOPHAGITIS PRESENT: ICD-10-CM

## 2025-06-06 DIAGNOSIS — R11.0 NAUSEA: ICD-10-CM

## 2025-06-06 DIAGNOSIS — Z3A.19 19 WEEKS GESTATION OF PREGNANCY: ICD-10-CM

## 2025-06-06 PROCEDURE — 76705 ECHO EXAM OF ABDOMEN: CPT | Mod: TC | Performed by: RADIOLOGY

## 2025-06-09 ENCOUNTER — MEDICAL CORRESPONDENCE (OUTPATIENT)
Dept: HEALTH INFORMATION MANAGEMENT | Facility: CLINIC | Age: 40
End: 2025-06-09
Payer: COMMERCIAL

## 2025-06-09 ENCOUNTER — TRANSCRIBE ORDERS (OUTPATIENT)
Dept: OTHER | Age: 40
End: 2025-06-09

## 2025-06-09 DIAGNOSIS — R93.2 ABNORMAL ULTRASOUND OF GALLBLADDER: Primary | ICD-10-CM

## 2025-07-19 ENCOUNTER — HEALTH MAINTENANCE LETTER (OUTPATIENT)
Age: 40
End: 2025-07-19

## (undated) DEVICE — SOL RINGERS LACTATED 1000ML BAG 2B2324X

## (undated) DEVICE — GLOVE PROTEXIS MICRO 7.5  2D73PM75

## (undated) DEVICE — GLOVE PROTEXIS W/NEU-THERA 8.0  2D73TE80

## (undated) DEVICE — CATH TRAY FOLEY 16FR BARDEX W/DRAIN BAG STATLOCK 300316A

## (undated) DEVICE — LINEN TOWEL PACK X5 5464

## (undated) DEVICE — PREP SKIN SCRUB TRAY 4461A

## (undated) DEVICE — EVAC SYSTEM CLEAR FLOW SC082500

## (undated) DEVICE — SYSTEM CLEARIFY VISUALIZATION 21-345

## (undated) DEVICE — ESU HOLDER LAP INST DISP PURPLE LONG 330MM H-PRO-330

## (undated) DEVICE — NDL 19GA 1.5"

## (undated) DEVICE — SOL WATER IRRIG 1000ML BOTTLE 2F7114

## (undated) DEVICE — TUBING HYDRO-SURG PLUS LAP IRRIGATOR SUCTION 0026870

## (undated) DEVICE — SU VICRYL 4-0 PS-2 18" UND J496H

## (undated) DEVICE — NDL INSUFFLATION 13GA 120MM C2201

## (undated) DEVICE — Device

## (undated) RX ORDER — DEXAMETHASONE SODIUM PHOSPHATE 4 MG/ML
INJECTION, SOLUTION INTRA-ARTICULAR; INTRALESIONAL; INTRAMUSCULAR; INTRAVENOUS; SOFT TISSUE
Status: DISPENSED
Start: 2019-11-21

## (undated) RX ORDER — HYDROCODONE BITARTRATE AND ACETAMINOPHEN 5; 325 MG/1; MG/1
TABLET ORAL
Status: DISPENSED
Start: 2019-11-21

## (undated) RX ORDER — KETOROLAC TROMETHAMINE 15 MG/ML
INJECTION, SOLUTION INTRAMUSCULAR; INTRAVENOUS
Status: DISPENSED
Start: 2019-11-21

## (undated) RX ORDER — PROPOFOL 10 MG/ML
INJECTION, EMULSION INTRAVENOUS
Status: DISPENSED
Start: 2019-11-21

## (undated) RX ORDER — FENTANYL CITRATE 50 UG/ML
INJECTION, SOLUTION INTRAMUSCULAR; INTRAVENOUS
Status: DISPENSED
Start: 2019-11-21

## (undated) RX ORDER — LIDOCAINE HYDROCHLORIDE 20 MG/ML
INJECTION, SOLUTION EPIDURAL; INFILTRATION; INTRACAUDAL; PERINEURAL
Status: DISPENSED
Start: 2019-11-21

## (undated) RX ORDER — ONDANSETRON 2 MG/ML
INJECTION INTRAMUSCULAR; INTRAVENOUS
Status: DISPENSED
Start: 2019-11-21

## (undated) RX ORDER — NEOSTIGMINE METHYLSULFATE 1 MG/ML
VIAL (ML) INJECTION
Status: DISPENSED
Start: 2019-11-21

## (undated) RX ORDER — FENTANYL CITRATE 0.05 MG/ML
INJECTION, SOLUTION INTRAMUSCULAR; INTRAVENOUS
Status: DISPENSED
Start: 2019-11-21

## (undated) RX ORDER — GLYCOPYRROLATE 0.2 MG/ML
INJECTION, SOLUTION INTRAMUSCULAR; INTRAVENOUS
Status: DISPENSED
Start: 2019-11-21